# Patient Record
Sex: MALE | Race: WHITE
[De-identification: names, ages, dates, MRNs, and addresses within clinical notes are randomized per-mention and may not be internally consistent; named-entity substitution may affect disease eponyms.]

---

## 2017-04-13 ENCOUNTER — HOSPITAL ENCOUNTER (OUTPATIENT)
Dept: HOSPITAL 79 - KOH-I | Age: 77
End: 2017-04-13
Payer: COMMERCIAL

## 2017-04-13 DIAGNOSIS — J32.8: Primary | ICD-10-CM

## 2021-01-29 ENCOUNTER — IMMUNIZATION (OUTPATIENT)
Dept: VACCINE CLINIC | Facility: HOSPITAL | Age: 81
End: 2021-01-29

## 2021-01-29 ENCOUNTER — APPOINTMENT (OUTPATIENT)
Dept: VACCINE CLINIC | Facility: HOSPITAL | Age: 81
End: 2021-01-29

## 2021-01-29 PROCEDURE — 91300 HC SARSCOV02 VAC 30MCG/0.3ML IM: CPT | Performed by: FAMILY MEDICINE

## 2021-01-29 PROCEDURE — 0001A: CPT | Performed by: FAMILY MEDICINE

## 2021-02-10 ENCOUNTER — HOSPITAL ENCOUNTER (OUTPATIENT)
Dept: HOSPITAL 79 - LAB | Age: 81
End: 2021-02-10
Payer: COMMERCIAL

## 2021-02-10 DIAGNOSIS — N18.4: Primary | ICD-10-CM

## 2021-02-10 LAB
BUN/CREATININE RATIO: 12 (ref 0–10)
HGB BLD-MCNC: 13.4 GM/DL (ref 14–17.5)
RED BLOOD COUNT: 4.78 M/UL (ref 4.2–5.5)
WHITE BLOOD COUNT: 6 K/UL (ref 4.5–11)

## 2021-02-19 ENCOUNTER — IMMUNIZATION (OUTPATIENT)
Dept: VACCINE CLINIC | Facility: HOSPITAL | Age: 81
End: 2021-02-19

## 2021-02-19 PROCEDURE — 91300 HC SARSCOV02 VAC 30MCG/0.3ML IM: CPT | Performed by: INTERNAL MEDICINE

## 2021-02-19 PROCEDURE — 0002A: CPT | Performed by: INTERNAL MEDICINE

## 2021-03-10 ENCOUNTER — HOSPITAL ENCOUNTER (EMERGENCY)
Dept: HOSPITAL 79 - ER1 | Age: 81
Discharge: HOME | End: 2021-03-10
Payer: COMMERCIAL
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
## 2021-03-10 DIAGNOSIS — Z23: ICD-10-CM

## 2021-03-10 DIAGNOSIS — S81.812A: Primary | ICD-10-CM
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
## 2021-09-03 ENCOUNTER — IMMUNIZATION (OUTPATIENT)
Dept: VACCINE CLINIC | Facility: HOSPITAL | Age: 81
End: 2021-09-03

## 2021-09-03 PROCEDURE — 91300 HC SARSCOV02 VAC 30MCG/0.3ML IM: CPT | Performed by: INTERNAL MEDICINE

## 2021-09-03 PROCEDURE — 0003A: CPT | Performed by: INTERNAL MEDICINE

## 2022-01-20 ENCOUNTER — HOSPITAL ENCOUNTER (OUTPATIENT)
Dept: HOSPITAL 79 - LAB | Age: 82
End: 2022-01-20
Payer: COMMERCIAL

## 2022-01-20 DIAGNOSIS — M79.10: Primary | ICD-10-CM

## 2022-01-20 LAB — BUN/CREATININE RATIO: 12 (ref 0–10)

## 2022-03-24 ENCOUNTER — HOSPITAL ENCOUNTER (OUTPATIENT)
Dept: HOSPITAL 79 - LAB | Age: 82
End: 2022-03-24
Payer: COMMERCIAL

## 2022-03-24 DIAGNOSIS — N18.4: Primary | ICD-10-CM

## 2022-03-24 LAB
BUN/CREATININE RATIO: 16 (ref 0–10)
HGB BLD-MCNC: 12.6 GM/DL (ref 14–17.5)
RED BLOOD COUNT: 4.55 M/UL (ref 4.2–5.5)
WHITE BLOOD COUNT: 5.6 K/UL (ref 4.5–11)

## 2022-06-22 ENCOUNTER — HOSPITAL ENCOUNTER (OUTPATIENT)
Dept: HOSPITAL 79 - LAB | Age: 82
End: 2022-06-22
Payer: COMMERCIAL

## 2022-06-22 DIAGNOSIS — D63.1: ICD-10-CM

## 2022-06-22 DIAGNOSIS — E83.52: ICD-10-CM

## 2022-06-22 DIAGNOSIS — N18.4: Primary | ICD-10-CM

## 2022-06-22 LAB
BUN/CREATININE RATIO: 11 (ref 0–10)
HGB BLD-MCNC: 11.8 GM/DL (ref 14–17.5)
RED BLOOD COUNT: 4.15 M/UL (ref 4.2–5.5)
WHITE BLOOD COUNT: 4.7 K/UL (ref 4.5–11)

## 2022-08-17 ENCOUNTER — HOSPITAL ENCOUNTER (OUTPATIENT)
Dept: HOSPITAL 79 - LAB | Age: 82
End: 2022-08-17
Payer: COMMERCIAL

## 2022-08-17 DIAGNOSIS — N18.4: Primary | ICD-10-CM

## 2022-08-17 LAB
BUN/CREATININE RATIO: 11 (ref 0–10)
HGB BLD-MCNC: 12.5 GM/DL (ref 14–17.5)
RED BLOOD COUNT: 4.41 M/UL (ref 4.2–5.5)
WHITE BLOOD COUNT: 4.6 K/UL (ref 4.5–11)

## 2024-05-13 ENCOUNTER — HOSPITAL ENCOUNTER (OUTPATIENT)
Dept: GENERAL RADIOLOGY | Facility: HOSPITAL | Age: 84
Discharge: HOME | End: 2024-05-13
Admitting: FAMILY MEDICINE
Payer: MEDICARE

## 2024-05-13 DIAGNOSIS — R06.02 SHORTNESS OF BREATH: ICD-10-CM

## 2024-05-13 PROCEDURE — 71046 X-RAY EXAM CHEST 2 VIEWS: CPT | Performed by: RADIOLOGY

## 2024-05-13 PROCEDURE — 71046 X-RAY EXAM CHEST 2 VIEWS: CPT

## 2024-10-04 ENCOUNTER — PREP FOR SURGERY (OUTPATIENT)
Dept: OTHER | Facility: HOSPITAL | Age: 84
End: 2024-10-04
Payer: MEDICARE

## 2024-10-04 RX ORDER — ACETAMINOPHEN 325 MG/1
650 TABLET ORAL EVERY 4 HOURS PRN
Status: CANCELLED | OUTPATIENT
Start: 2024-10-04

## 2024-10-04 RX ORDER — TORSEMIDE 20 MG/1
20 TABLET ORAL
Status: CANCELLED | OUTPATIENT
Start: 2024-10-04

## 2024-10-04 RX ORDER — GUAIFENESIN 600 MG/1
600 TABLET, EXTENDED RELEASE ORAL EVERY 12 HOURS SCHEDULED
Status: CANCELLED | OUTPATIENT
Start: 2024-10-04 | End: 2024-10-09

## 2024-10-04 RX ORDER — HYDRALAZINE HYDROCHLORIDE 50 MG/1
100 TABLET, FILM COATED ORAL EVERY 8 HOURS SCHEDULED
Status: CANCELLED | OUTPATIENT
Start: 2024-10-04

## 2024-10-04 RX ORDER — SEVELAMER CARBONATE 800 MG/1
2400 TABLET, FILM COATED ORAL
Status: CANCELLED | OUTPATIENT
Start: 2024-10-04

## 2024-10-04 RX ORDER — AMLODIPINE BESYLATE 10 MG/1
10 TABLET ORAL
Status: CANCELLED | OUTPATIENT
Start: 2024-10-04

## 2024-10-04 RX ORDER — DOCUSATE SODIUM 100 MG/1
100 CAPSULE, LIQUID FILLED ORAL 2 TIMES DAILY
Status: CANCELLED | OUTPATIENT
Start: 2024-10-04

## 2024-10-04 RX ORDER — PANTOPRAZOLE SODIUM 40 MG/1
40 TABLET, DELAYED RELEASE ORAL
Status: CANCELLED | OUTPATIENT
Start: 2024-10-05

## 2024-10-04 RX ORDER — ALBUTEROL SULFATE 90 UG/1
2 INHALANT RESPIRATORY (INHALATION)
Status: CANCELLED | OUTPATIENT
Start: 2024-10-04

## 2024-10-04 RX ORDER — FLUOXETINE 10 MG/1
10 CAPSULE ORAL DAILY
Status: CANCELLED | OUTPATIENT
Start: 2024-10-04

## 2024-10-04 RX ORDER — TAMSULOSIN HYDROCHLORIDE 0.4 MG/1
0.4 CAPSULE ORAL DAILY
Status: CANCELLED | OUTPATIENT
Start: 2024-10-04

## 2024-10-04 RX ORDER — ALPRAZOLAM 0.25 MG
0.25 TABLET ORAL 2 TIMES DAILY PRN
Status: CANCELLED | OUTPATIENT
Start: 2024-10-04 | End: 2024-10-09

## 2024-10-04 RX ORDER — FLUTICASONE PROPIONATE 50 UG/1
2 SPRAY, METERED NASAL DAILY
Status: CANCELLED | OUTPATIENT
Start: 2024-10-04

## 2024-10-04 RX ORDER — CARVEDILOL 6.25 MG/1
12.5 TABLET ORAL 2 TIMES DAILY WITH MEALS
Status: CANCELLED | OUTPATIENT
Start: 2024-10-04

## 2024-10-05 ENCOUNTER — APPOINTMENT (OUTPATIENT)
Dept: GENERAL RADIOLOGY | Facility: HOSPITAL | Age: 84
DRG: 949 | End: 2024-10-05
Payer: MEDICARE

## 2024-10-05 ENCOUNTER — HOSPITAL ENCOUNTER (INPATIENT)
Facility: HOSPITAL | Age: 84
LOS: 3 days | Discharge: HOME HEALTH | DRG: 949 | End: 2024-10-08
Attending: FAMILY MEDICINE | Admitting: FAMILY MEDICINE
Payer: MEDICARE

## 2024-10-05 DIAGNOSIS — S06.5XAA SUBDURAL HEMATOMA: ICD-10-CM

## 2024-10-05 DIAGNOSIS — J96.01 ACUTE RESPIRATORY FAILURE WITH HYPOXIA: Primary | ICD-10-CM

## 2024-10-05 LAB
ALBUMIN SERPL-MCNC: 3.8 G/DL (ref 3.5–5.2)
ALBUMIN/GLOB SERPL: 1.7 G/DL
ALP SERPL-CCNC: 131 U/L (ref 39–117)
ALT SERPL W P-5'-P-CCNC: <5 U/L (ref 1–41)
ANION GAP SERPL CALCULATED.3IONS-SCNC: 13.5 MMOL/L (ref 5–15)
AST SERPL-CCNC: 16 U/L (ref 1–40)
BASOPHILS # BLD AUTO: 0.03 10*3/MM3 (ref 0–0.2)
BASOPHILS NFR BLD AUTO: 0.7 % (ref 0–1.5)
BILIRUB SERPL-MCNC: 0.5 MG/DL (ref 0–1.2)
BUN SERPL-MCNC: 31 MG/DL (ref 8–23)
BUN/CREAT SERPL: 5.7 (ref 7–25)
CALCIUM SPEC-SCNC: 9.5 MG/DL (ref 8.6–10.5)
CHLORIDE SERPL-SCNC: 95 MMOL/L (ref 98–107)
CO2 SERPL-SCNC: 20.5 MMOL/L (ref 22–29)
CREAT SERPL-MCNC: 5.41 MG/DL (ref 0.76–1.27)
DEPRECATED RDW RBC AUTO: 49.5 FL (ref 37–54)
EGFRCR SERPLBLD CKD-EPI 2021: 9.7 ML/MIN/1.73
EOSINOPHIL # BLD AUTO: 0.05 10*3/MM3 (ref 0–0.4)
EOSINOPHIL NFR BLD AUTO: 1.2 % (ref 0.3–6.2)
ERYTHROCYTE [DISTWIDTH] IN BLOOD BY AUTOMATED COUNT: 14.6 % (ref 12.3–15.4)
GLOBULIN UR ELPH-MCNC: 2.3 GM/DL
GLUCOSE SERPL-MCNC: 107 MG/DL (ref 65–99)
HAV IGM SERPL QL IA: NORMAL
HBV CORE IGM SERPL QL IA: NORMAL
HBV SURFACE AG SERPL QL IA: NORMAL
HCT VFR BLD AUTO: 31.3 % (ref 37.5–51)
HCV AB SER QL: NORMAL
HGB BLD-MCNC: 9.6 G/DL (ref 13–17.7)
IMM GRANULOCYTES # BLD AUTO: 0.01 10*3/MM3 (ref 0–0.05)
IMM GRANULOCYTES NFR BLD AUTO: 0.2 % (ref 0–0.5)
LYMPHOCYTES # BLD AUTO: 1.01 10*3/MM3 (ref 0.7–3.1)
LYMPHOCYTES NFR BLD AUTO: 23.3 % (ref 19.6–45.3)
MAGNESIUM SERPL-MCNC: 2.5 MG/DL (ref 1.6–2.4)
MCH RBC QN AUTO: 28.2 PG (ref 26.6–33)
MCHC RBC AUTO-ENTMCNC: 30.7 G/DL (ref 31.5–35.7)
MCV RBC AUTO: 92.1 FL (ref 79–97)
MONOCYTES # BLD AUTO: 0.46 10*3/MM3 (ref 0.1–0.9)
MONOCYTES NFR BLD AUTO: 10.6 % (ref 5–12)
NEUTROPHILS NFR BLD AUTO: 2.78 10*3/MM3 (ref 1.7–7)
NEUTROPHILS NFR BLD AUTO: 64 % (ref 42.7–76)
NRBC BLD AUTO-RTO: 0 /100 WBC (ref 0–0.2)
PLATELET # BLD AUTO: 135 10*3/MM3 (ref 140–450)
PMV BLD AUTO: 10.2 FL (ref 6–12)
POTASSIUM SERPL-SCNC: 4.6 MMOL/L (ref 3.5–5.2)
PROT SERPL-MCNC: 6.1 G/DL (ref 6–8.5)
RBC # BLD AUTO: 3.4 10*6/MM3 (ref 4.14–5.8)
SODIUM SERPL-SCNC: 129 MMOL/L (ref 136–145)
WBC NRBC COR # BLD AUTO: 4.34 10*3/MM3 (ref 3.4–10.8)

## 2024-10-05 PROCEDURE — 83735 ASSAY OF MAGNESIUM: CPT | Performed by: FAMILY MEDICINE

## 2024-10-05 PROCEDURE — 99223 1ST HOSP IP/OBS HIGH 75: CPT | Performed by: FAMILY MEDICINE

## 2024-10-05 PROCEDURE — 80053 COMPREHEN METABOLIC PANEL: CPT | Performed by: FAMILY MEDICINE

## 2024-10-05 PROCEDURE — 85025 COMPLETE CBC W/AUTO DIFF WBC: CPT | Performed by: FAMILY MEDICINE

## 2024-10-05 PROCEDURE — 97530 THERAPEUTIC ACTIVITIES: CPT

## 2024-10-05 PROCEDURE — 94640 AIRWAY INHALATION TREATMENT: CPT

## 2024-10-05 PROCEDURE — 94799 UNLISTED PULMONARY SVC/PX: CPT

## 2024-10-05 PROCEDURE — 92523 SPEECH SOUND LANG COMPREHEN: CPT | Performed by: SPEECH-LANGUAGE PATHOLOGIST

## 2024-10-05 PROCEDURE — 94761 N-INVAS EAR/PLS OXIMETRY MLT: CPT

## 2024-10-05 PROCEDURE — 97535 SELF CARE MNGMENT TRAINING: CPT

## 2024-10-05 PROCEDURE — 97161 PT EVAL LOW COMPLEX 20 MIN: CPT

## 2024-10-05 PROCEDURE — 80074 ACUTE HEPATITIS PANEL: CPT | Performed by: INTERNAL MEDICINE

## 2024-10-05 PROCEDURE — 97116 GAIT TRAINING THERAPY: CPT

## 2024-10-05 PROCEDURE — 94664 DEMO&/EVAL PT USE INHALER: CPT

## 2024-10-05 PROCEDURE — 97167 OT EVAL HIGH COMPLEX 60 MIN: CPT

## 2024-10-05 PROCEDURE — 71045 X-RAY EXAM CHEST 1 VIEW: CPT

## 2024-10-05 RX ORDER — CARVEDILOL 6.25 MG/1
6.25 TABLET ORAL 2 TIMES DAILY WITH MEALS
Status: ON HOLD | COMMUNITY
End: 2024-10-08

## 2024-10-05 RX ORDER — TORSEMIDE 10 MG/1
10 TABLET ORAL 2 TIMES DAILY
Status: ON HOLD | COMMUNITY
End: 2024-10-08

## 2024-10-05 RX ORDER — TORSEMIDE 20 MG/1
20 TABLET ORAL
Status: DISCONTINUED | OUTPATIENT
Start: 2024-10-05 | End: 2024-10-06

## 2024-10-05 RX ORDER — CARVEDILOL 6.25 MG/1
12.5 TABLET ORAL 2 TIMES DAILY WITH MEALS
Status: DISCONTINUED | OUTPATIENT
Start: 2024-10-05 | End: 2024-10-08 | Stop reason: HOSPADM

## 2024-10-05 RX ORDER — AMLODIPINE BESYLATE 10 MG/1
10 TABLET ORAL DAILY
COMMUNITY

## 2024-10-05 RX ORDER — FLUOXETINE 10 MG/1
10 CAPSULE ORAL DAILY
Status: DISCONTINUED | OUTPATIENT
Start: 2024-10-05 | End: 2024-10-08 | Stop reason: HOSPADM

## 2024-10-05 RX ORDER — PANTOPRAZOLE SODIUM 40 MG/1
40 TABLET, DELAYED RELEASE ORAL
Status: DISCONTINUED | OUTPATIENT
Start: 2024-10-05 | End: 2024-10-08 | Stop reason: HOSPADM

## 2024-10-05 RX ORDER — AMLODIPINE BESYLATE 10 MG/1
10 TABLET ORAL
Status: DISCONTINUED | OUTPATIENT
Start: 2024-10-05 | End: 2024-10-08 | Stop reason: HOSPADM

## 2024-10-05 RX ORDER — ALBUTEROL SULFATE 1.25 MG/3ML
1 SOLUTION RESPIRATORY (INHALATION) EVERY 6 HOURS PRN
COMMUNITY

## 2024-10-05 RX ORDER — GABAPENTIN 300 MG/1
300 CAPSULE ORAL 2 TIMES DAILY
Status: ON HOLD | COMMUNITY
End: 2024-10-08

## 2024-10-05 RX ORDER — PANTOPRAZOLE SODIUM 40 MG/1
40 TABLET, DELAYED RELEASE ORAL 2 TIMES DAILY
COMMUNITY

## 2024-10-05 RX ORDER — TAMSULOSIN HYDROCHLORIDE 0.4 MG/1
1 CAPSULE ORAL DAILY
COMMUNITY

## 2024-10-05 RX ORDER — GABAPENTIN 300 MG/1
300 CAPSULE ORAL NIGHTLY
Status: DISCONTINUED | OUTPATIENT
Start: 2024-10-05 | End: 2024-10-06

## 2024-10-05 RX ORDER — SEVELAMER CARBONATE 800 MG/1
2400 TABLET, FILM COATED ORAL
Status: DISCONTINUED | OUTPATIENT
Start: 2024-10-05 | End: 2024-10-08 | Stop reason: HOSPADM

## 2024-10-05 RX ORDER — ACETAMINOPHEN 325 MG/1
650 TABLET ORAL EVERY 4 HOURS PRN
Status: DISCONTINUED | OUTPATIENT
Start: 2024-10-05 | End: 2024-10-08 | Stop reason: HOSPADM

## 2024-10-05 RX ORDER — DOCUSATE SODIUM 100 MG/1
100 CAPSULE, LIQUID FILLED ORAL 2 TIMES DAILY
Status: DISCONTINUED | OUTPATIENT
Start: 2024-10-05 | End: 2024-10-08 | Stop reason: HOSPADM

## 2024-10-05 RX ORDER — ALBUTEROL SULFATE 90 UG/1
2 INHALANT RESPIRATORY (INHALATION)
Status: DISCONTINUED | OUTPATIENT
Start: 2024-10-05 | End: 2024-10-08 | Stop reason: HOSPADM

## 2024-10-05 RX ORDER — FLUTICASONE PROPIONATE 50 UG/1
2 SPRAY, METERED NASAL DAILY
Status: DISCONTINUED | OUTPATIENT
Start: 2024-10-05 | End: 2024-10-08 | Stop reason: HOSPADM

## 2024-10-05 RX ORDER — TAMSULOSIN HYDROCHLORIDE 0.4 MG/1
0.4 CAPSULE ORAL DAILY
Status: DISCONTINUED | OUTPATIENT
Start: 2024-10-05 | End: 2024-10-08 | Stop reason: HOSPADM

## 2024-10-05 RX ORDER — GUAIFENESIN 600 MG/1
600 TABLET, EXTENDED RELEASE ORAL EVERY 12 HOURS SCHEDULED
Status: DISCONTINUED | OUTPATIENT
Start: 2024-10-05 | End: 2024-10-07

## 2024-10-05 RX ORDER — FLUOXETINE 10 MG/1
10 CAPSULE ORAL DAILY
COMMUNITY

## 2024-10-05 RX ORDER — ALPRAZOLAM 0.25 MG
0.25 TABLET ORAL 2 TIMES DAILY PRN
COMMUNITY

## 2024-10-05 RX ORDER — HYDRALAZINE HYDROCHLORIDE 50 MG/1
100 TABLET, FILM COATED ORAL EVERY 8 HOURS SCHEDULED
Status: DISCONTINUED | OUTPATIENT
Start: 2024-10-05 | End: 2024-10-08 | Stop reason: HOSPADM

## 2024-10-05 RX ORDER — FLUTICASONE PROPIONATE 50 UG/1
2 SPRAY, METERED NASAL DAILY
COMMUNITY

## 2024-10-05 RX ORDER — ALPRAZOLAM 0.25 MG
0.25 TABLET ORAL 2 TIMES DAILY PRN
Status: DISCONTINUED | OUTPATIENT
Start: 2024-10-05 | End: 2024-10-07

## 2024-10-05 RX ORDER — HYDROCODONE BITARTRATE AND ACETAMINOPHEN 7.5; 325 MG/1; MG/1
1 TABLET ORAL EVERY 8 HOURS PRN
COMMUNITY

## 2024-10-05 RX ORDER — PANTOPRAZOLE SODIUM 40 MG/1
40 TABLET, DELAYED RELEASE ORAL
Status: DISCONTINUED | OUTPATIENT
Start: 2024-10-05 | End: 2024-10-05

## 2024-10-05 RX ADMIN — GUAIFENESIN 600 MG: 600 TABLET ORAL at 21:22

## 2024-10-05 RX ADMIN — HYDRALAZINE HYDROCHLORIDE 100 MG: 50 TABLET ORAL at 13:35

## 2024-10-05 RX ADMIN — CARVEDILOL 12.5 MG: 6.25 TABLET, FILM COATED ORAL at 17:57

## 2024-10-05 RX ADMIN — DOCUSATE SODIUM 100 MG: 100 CAPSULE, LIQUID FILLED ORAL at 09:37

## 2024-10-05 RX ADMIN — PANTOPRAZOLE SODIUM 40 MG: 40 TABLET, DELAYED RELEASE ORAL at 05:16

## 2024-10-05 RX ADMIN — SEVELAMER CARBONATE 2400 MG: 800 TABLET, FILM COATED ORAL at 17:57

## 2024-10-05 RX ADMIN — ALBUTEROL SULFATE 2 PUFF: 90 AEROSOL, METERED RESPIRATORY (INHALATION) at 19:26

## 2024-10-05 RX ADMIN — TORSEMIDE 20 MG: 20 TABLET ORAL at 09:33

## 2024-10-05 RX ADMIN — SEVELAMER CARBONATE 2400 MG: 800 TABLET, FILM COATED ORAL at 12:12

## 2024-10-05 RX ADMIN — HYDRALAZINE HYDROCHLORIDE 100 MG: 50 TABLET ORAL at 05:16

## 2024-10-05 RX ADMIN — GUAIFENESIN 600 MG: 600 TABLET ORAL at 09:37

## 2024-10-05 RX ADMIN — FLUOXETINE HYDROCHLORIDE 10 MG: 10 CAPSULE ORAL at 09:33

## 2024-10-05 RX ADMIN — TAMSULOSIN HYDROCHLORIDE 0.4 MG: 0.4 CAPSULE ORAL at 09:33

## 2024-10-05 RX ADMIN — TORSEMIDE 20 MG: 20 TABLET ORAL at 17:57

## 2024-10-05 RX ADMIN — GABAPENTIN 300 MG: 300 CAPSULE ORAL at 21:22

## 2024-10-05 RX ADMIN — AMLODIPINE BESYLATE 10 MG: 10 TABLET ORAL at 09:33

## 2024-10-05 RX ADMIN — HYDRALAZINE HYDROCHLORIDE 100 MG: 50 TABLET ORAL at 21:22

## 2024-10-05 RX ADMIN — CARVEDILOL 12.5 MG: 6.25 TABLET, FILM COATED ORAL at 09:33

## 2024-10-05 RX ADMIN — ACETAMINOPHEN 650 MG: 325 TABLET ORAL at 09:33

## 2024-10-05 RX ADMIN — ALBUTEROL SULFATE 2 PUFF: 90 AEROSOL, METERED RESPIRATORY (INHALATION) at 12:30

## 2024-10-05 RX ADMIN — PANTOPRAZOLE SODIUM 40 MG: 40 TABLET, DELAYED RELEASE ORAL at 17:58

## 2024-10-05 RX ADMIN — DOCUSATE SODIUM 100 MG: 100 CAPSULE, LIQUID FILLED ORAL at 21:22

## 2024-10-05 RX ADMIN — SEVELAMER CARBONATE 2400 MG: 800 TABLET, FILM COATED ORAL at 09:33

## 2024-10-05 RX ADMIN — ALBUTEROL SULFATE 2 PUFF: 90 AEROSOL, METERED RESPIRATORY (INHALATION) at 07:22

## 2024-10-05 NOTE — PROGRESS NOTES
Patient Assessment Instrument  Quality Indicators - Admission FY 2023    Section A. Ethnicity/ Race/Language  Ethnicity:  Race:  Preferred Language:    Section A. Transportation      Section B. Hearing and Vision        Section B. Health Literacy        Section C. Cognitive Patterns      Section C. Signs and Symptoms of Delirium (from CAM)      Section D. Mood      Section D. Social Isolation      Section [id number]. Prior Functioning      Section CI5038. Prior Device Use      Section GE0578. Self Care Performance     Eating: [place] provides verbal cues and/or touching/steadying and/or contact  guard assistance as patient completes activity.   Oral Hygiene: [place] provides verbal cues and/or touching/steadying and/or  contact guard assistance as patient completes activity.   Toileting Hygiene: [place] does less than half the effort. [place] lifts, holds  or supports trunk or limbs but provides less than half the effort.   Shower/Bathe Self: [place] does less than half the effort. [place] lifts, holds  or supports trunk or limbs but provides less than half the effort.   Upper Body Dressing: [place] sets up or cleans up; patient completes activity.  [place] assists only prior to or following the activity.   Lower Body Dressing: [place] does more than half the effort. [place] lifts or  holds trunk or limbs and provides more than half the effort.   Putting On/Taking Off Footwear: [place] does more than half the effort. [place]  lifts or holds trunk or limbs and provides more than half the effort.    Section [id number]. Self Care Discharge Goals     Eating: [place] sets up or cleans up; patient completes activity. [place] assists  only prior to or following the activity.   Oral Hygiene: [place] sets up or cleans up; patient completes activity. [place]  assists only prior to or following the activity.   Toileting Hygiene: [place] provides verbal cues or touching/steadying assistance  as patient completes activity.   Shower/Bathe Self: [place]  provides verbal cues or touching/steadying assistance  as patient completes activity.   Upper Body Dressing: [place] sets up or cleans up; patient completes activity.  [place] assists only prior to or following the activity.   Lower Body Dressing: [place] does less than half the effort. [place] lifts, holds  or supports trunk or limbs but provides less than half the effort.   Putting On/Taking Off Footwear: [place] does less than half the effort. [place]  lifts, holds or supports trunk or limbs but provides less than half the effort.    Section [id number]. Mobility Performance      Section [id number]. Mobility Discharge Goals      Section H. Bladder and Bowel      Section I. Active Diagnosis      Section J. Health Conditions      Section J. Health Conditions (Pain)      Section K. Swallowing/Nutritional Status    Nutritional Approaches on Admission:    Section M. Skin Conditions      Section N. Medication        Section O. Special Treatments, Procedures, and Programs    Signed by: Nolvia Nguyen OT

## 2024-10-05 NOTE — PROGRESS NOTES
Occupational Therapy:    Physical Therapy:    Speech Language Pathology: Individual: 45 minutes.    Signed by: RAINER López

## 2024-10-05 NOTE — PROGRESS NOTES
Occupational Therapy: Individual: 70 minutes.    Physical Therapy:    Speech Language Pathology:    Signed by: Nolvia Nguyen OT

## 2024-10-05 NOTE — PROGRESS NOTES
Inpatient Rehabilitation Functional Measures Assessment and Plan of Care    Plan of Care      Functional Measures  LESVIA Eating:  LESVIA Grooming:  LESVIA Bathing:  LESVIA Upper Body Dressing:  LESVIA Lower Body Dressing:  LESVIA Toileting:    LESVIA Bladder Management  Level of Assistance:  Frequency/Number of Accidents this Shift:    LESVIA Bowel Management  Level of Assistance:  Frequency/Number of Accidents this Shift:    LESVIA Bed/Chair/Wheelchair Transfer:  Bed/chair/wheelchair Transfer Score = 4.  Patient performs 75% or more of effort and minimal assistance (little/incidental  help/lifting of one limb/steadying) for transferring to and from the  bed/chair/wheelchair, requiring: Patient requires the following assistive  device(s): Walker. Seating system of wheelchair.  LESVIA Toilet Transfer:  LESVIA Tub/Shower Transfer:    Previously Documented Mode of Locomotion at Discharge:  LESVIA Expected Mode of Locomotion at Discharge: The expected mode of most  frequently used locomotion, at discharge, is expected to be walking.  LESVIA Walk/Wheelchair:  WHEELCHAIR OBSERVATION   Wheelchair did not occur.    WALK OBSERVATION   Walk Distance Scale = 2.  Distance walked is 50 -149 feet. Walk Score = 2.  Patient performs 75% or more of effort and requires minimal assistance.  Incidental assistance, contact guard or steadying was provided. Patient walked a  distance of 110 feet. Patient requires the following assistive device(s):  Rolling walker.  LESVIA Stairs:  Stairs did not occur.    LESVIA Comprehension:  LESVIA Expression:  LESVIA Social Interaction:  LESVIA Problem Solving:  LESVIA Memory:    Therapy Mode Minutes  Occupational Therapy:  Physical Therapy: Individual: 75 minutes.  Speech Language Pathology:    Discharge Functional Goals:  Bed, Chair, Wheelchair Transfers: 6  Walk: 5    Signed by: Deepika Puri, Physical Therapist

## 2024-10-05 NOTE — PROGRESS NOTES
"Patient Assessment Instrument  Quality Indicators - Admission FY 2023    Section A. Ethnicity/ Race/Language  Ethnicity:  Not of , /a, French Origin  Race:  White  Preferred Language:  english  Requests  to Communicate:   No    Section A. Transportation      Section B. Hearing and Vision  Expression of Ideas and Wants: Exhibits some difficulty with expressing needs  and ideas (e.g., some words or finishing thoughts) or speech is not clear.  Understanding Verbal and Non-Verbal Content: Usually Understands: Understands  most conversations, but misses some part/intent of message. Requires cues at  times to understand.      Section B. Health Literacy  Frequency of Needing Assistance Reading:  Always      Section C. Cognitive Patterns  Brief Interview for Mental Status (BIMS) was conducted.  Repetition of Three Words: Three words  Able to report correct year: Correct  Able to report correct month: Accurate within 5 days  Able to report correct day of the week: Correct  Able to recall \"sock\": Yes, no cue required  Able to recall \"blue\": Yes, no cue required  Able to recall \"bed\": No, could not recall    BIMS SUMMARY SCORE: 13 Cognitively intact Patient was able to complete the Brief  Interview for Mental Status    Section C. Signs and Symptoms of Delirium (from CAM)      Section D. Mood  Presence of little interest or pleasure in doing things:   No  Frequency of having little interest or pleasure in doing things:   Never or 1  day  Presence of feeling down, depressed, or hopeless:   No  Frequency of feeling down, depressed, or hopeless:   Never or 1 day   Interview Ended. Above responses do not meet criteria to continue.  Total Severity Score:   00    Section D. Social Isolation  Frequ[place] of Feeling Lonely or Isolated:  Rarely    Section [id number]. Prior Functioning      Section SC7718. Prior Device Use      Section NA2155. Self Care Performance      Section [id number]. Self Care Discharge " Goals      Section CK3919. Mobility Performance      Section FD0636. Mobility Discharge Goals      Section H. Bladder and Bowel      Section I. Active Diagnosis      Section J. Health Conditions      Section J. Health Conditions (Pain)  Pain Effect on Sleep:   Occasionally  Pain Interference with Therapy Activities:   Rarely or not at all  Pain Interference with Day-to-Day Activities:   Occasionally    Section K. Swallowing/Nutritional Status    Nutritional Approaches on Admission:    Section M. Skin Conditions  Unhealed Pressure Ulcer/Injuries at Stage 1 or Higher on Admission:  No.    Section N. Medication        Section O. Special Treatments, Procedures, and Programs    Signed by: Sissy Jovel Nurse

## 2024-10-05 NOTE — PLAN OF CARE
Problem: Rehabilitation (IRF) Plan of Care  Goal: Absence of New-Onset Illness or Injury  Intervention: Prevent Fall and Fall Injury  Recent Flowsheet Documentation  Taken 10/5/2024 1000 by Patricia Nice RN  Safety Promotion/Fall Prevention:   fall prevention program maintained   nonskid shoes/slippers when out of bed   safety round/check completed  Taken 10/5/2024 0800 by Patricia Nice, RN  Safety Promotion/Fall Prevention:   fall prevention program maintained   nonskid shoes/slippers when out of bed   safety round/check completed  Taken 10/5/2024 0756 by Patricia Nice, RN  Safety Promotion/Fall Prevention:   fall prevention program maintained   nonskid shoes/slippers when out of bed   safety round/check completed   Goal Outcome Evaluation:

## 2024-10-05 NOTE — THERAPY EVALUATION
Inpatient Rehabilitation - Occupational Therapy Initial Evaluation and Treatment Note    NEAL Jimmy     Patient Name: Simon Benitez  : 1940  MRN: [id number]    Today's Date: 10/5/2024                 Admit Date: 10/5/2024       No diagnosis found.    Patient Active Problem List   Diagnosis    Subdural hematoma       Past Medical History:   Diagnosis Date    ESRD (end stage renal disease) on dialysis     Hypertension        History reviewed. No pertinent surgical history.          IRF OT ASSESSMENT FLOWSHEET (Last 12 Hours)       IRF OT Evaluation and Treatment       Row Name 10/05/24 1154          OT Time and Intention    Document Type initial evaluation;daily treatment  -HB     Mode of Treatment individual therapy;occupational therapy  -HB     Patient Effort good  -HB     Symptoms Noted During/After Treatment fatigue  -HB       Row Name 10/05/24 1154          General Information    Patient Profile Reviewed yes  -HB     Patient/Family/Caregiver Comments/Observations Patient and RN okd OT this date.  -HB     General Observations of Patient Patient tolerated OT well with no adverse reactions. Patient presents with B SDH s/p fall. Pt had ruben hold procedure at outside hospital.  -HB     Existing Precautions/Restrictions fall;oxygen therapy device and L/min  -HB       Row Name 10/05/24 1154          Previous Level of Function/Home Environm    Bathing/Grooming, Premorbid Functional Level independent  -HB     Dressing, Premorbid Functional Level independent  -HB     Eating/Feeding, Premorbid Functional Level independent  -HB     Toileting, Premorbid Functional Level independent  -HB     BADLs, Premorbid Functional Level independent  -HB       Row Name 10/05/24 1154          Living Environment    Current Living Arrangements home  -HB     People in Home spouse  -HB       Row Name 10/05/24 1154          Pain Assessment    Pretreatment Pain Rating 3/10  -HB     Posttreatment Pain Rating 3/10  -HB     Pain Location -  Side/Orientation --  neck  -HB       Row Name 10/05/24 1154          Pain Scale: FACES Pre/Post-Treatment    Pre/Posttreatment Pain Comment --  massage  -HB       Row Name 10/05/24 1154          Cognition/Psychosocial    Affect/Mental Status (Cognition) --  some intermitting confusion  -     Orientation Status (Cognition) disoriented to;time  -     Follows Commands (Cognition) follows one-step commands;increased processing time needed;physical/tactile prompts required;verbal cues/prompting required;repetition of directions required  -HB       Row Name 10/05/24 1154          Range of Motion Comprehensive    General Range of Motion bilateral upper extremity ROM WFL  -HB       Row Name 10/05/24 1154          Strength (Manual Muscle Testing)    Strength (Manual Muscle Testing) bilateral upper extremities  3+/5 grossly  -HB       Row Name 10/05/24 1154          Basic Activities of Daily Living (BADLs)    Basic Activities of Daily Living bathing;upper body dressing;lower body dressing;grooming;toileting;self-feeding  -HB       Row Name 10/05/24 1154          Bathing    [place] Level (Bathing) bathing skills;maximum assist (25% patient effort)  -HB       Row Name 10/05/24 1154          Upper Body Dressing    [place] Level (Upper Body Dressing) upper body dressing skills;minimum assist (75% or more patient effort)  -HB       Row Name 10/05/24 1154          Lower Body Dressing    [place] Level (Lower Body Dressing) maximum assist (25% patient effort)  -HB       Row Name 10/05/24 1154          Grooming    [place] Level (Grooming) grooming skills;minimum assist (75% patient effort)  -HB       Row Name 10/05/24 1154          Toileting    [place] Level (Toileting) toileting skills;moderate assist (50% patient effort)  -HB       Row Name 10/05/24 1154          Self-Feeding    [place] Level (Self-Feeding) feeding skills;minimum assist (75% patient effort)  -HB       Row Name 10/05/24 1154           Bed Mobility    Bed Mobility sit-supine  -     Supine-Sit [place] (Bed Mobility) contact guard;verbal cues;nonverbal cues (demo/gesture)  -       Row Name 10/05/24 1154          Transfer Assessment/Treatment    Transfers toilet transfer;chair-bed transfer  -       Row Name 10/05/24 1154          Chair-Bed Transfer    Chair-Bed [place] (Transfers) minimum assist (75% patient effort);nonverbal cues (demo/gesture);verbal cues  -     Assistive Device (Chair-Bed Transfers) wheelchair  -       Row Name 10/05/24 1154          Sit-Stand Transfer    Sit-Stand [place] (Transfers) minimum assist (75% patient effort);contact guard;nonverbal cues (demo/gesture);verbal cues  -       Row Name 10/05/24 1154          Stand-Sit Transfer    Stand-Sit [place] (Transfers) minimum assist (75% patient effort);contact guard;nonverbal cues (demo/gesture);verbal cues  -       Row Name 10/05/24 1154          Toilet Transfer    Type (Toilet Transfer) stand pivot/stand step  -     [place] Level (Toilet Transfer) minimum assist (75% patient effort);contact guard;nonverbal cues (demo/gesture);verbal cues  -     Assistive Device (Toilet Transfer) wheelchair;grab bars/safety frame;raised toilet seat  -       Row Name 10/05/24 1154          Motor Skills    Motor Skills coordination;functional endurance;motor control/coordination interventions  -     Motor Control/Coordination Interventions therapeutic exercise/ROM;gross motor coordination activities  -     Therapeutic Exercise shoulder;elbow/forearm;wrist;hand  -     Additional Documentation --  BUE TA To increase ADL status/endurnace; rest between tasks  -       Row Name 10/05/24 1154          Positioning and Restraints    Pre-Treatment Position sitting in chair/recliner  -     Post Treatment Position bed  -     In Bed encouraged to call for assist;call light within reach;exit alarm on  -       Row Name 10/05/24 1154          Therapy  Assessment/Plan (OT)    Patient's Goals For Discharge return home;take care of myself at home  -       Row Name 10/05/24 7630          Therapy Assessment/Plan (OT)    OT Diagnosis Decreased I/ADL status; functional mobility; BUE m/s; endurance  -     Rehab Potential/Prognosis (OT) adequate, monitor progress closely  -     Frequency of Treatment (OT) 5 times per week;90 minutes per session  -     Estimated Duration of Therapy (OT) 2 weeks  -     Activity Limitations Related to Problem List (OT) unable to ambulate safely;BADLs not performed adequately or safely;IADLs not performed adequately or safely;unable to transfer safely  -     Planned Therapy Interventions (OT) activity tolerance training;adaptive equipment training;BADL retraining;IADL retraining;passive ROM/stretching;ROM/therapeutic exercise;strengthening exercise;transfer/mobility retraining  -       Row Name 10/05/24 5742          Therapy Plan Review/Discharge Plan (OT)    Therapy Plan Review (OT) evaluation/treatment results reviewed;care plan/treatment goals reviewed;patient  -HB     Anticipated Equipment Needs At Discharge (OT) --  TBD  -HB     Anticipated Discharge Disposition (OT) home with home health;home with assist  -       Row Name 10/05/24 5305          IRF OT Goals    Transfer Goal Selection (OT-IRF) transfers, OT goal 1;transfers, OT goal 2  -HB     LB Dressing Goal Selection (OT-IRF) LB dressing, OT goal 1;LB dressing, OT goal 2  -HB     Toileting Goal Selection (OT-IRF) toileting, OT goal 1;toileting, OT goal 2  -       Row Name 10/05/24 6860          Transfer Goal 1 (OT-IRF)    Activity/Assistive Device (Transfer Goal 1, OT-IRF) toilet  -     [place] Level (Transfer Goal 1, OT-IRF) contact guard required  -     Time Frame (Transfer Goal 1, OT-IRF) short-term goal (STG)  -HB     Progress/Outcomes (Transfer Goal 1, OT-IRF) new goal  -       Row Name 10/05/24 0126          Transfer Goal 2 (OT-IRF)     Activity/Assistive Device (Transfer Goal 2, OT-IRF) toilet  -HB     [place] Level (Transfer Goal 2, OT-IRF) supervision required  -HB     Time Frame (Transfer Goal 2, OT-IRF) by discharge  -HB     Progress/Outcomes (Transfer Goal 2, OT-IRF) new goal  -HB       Row Name 10/05/24 1154          LB Dressing Goal 1 (OT-IRF)    Activity/Device (LB Dressing Goal 1, OT-IRF) lower body dressing  -HB     [place] (LB Dressing Goal 1, OT-IRF) moderate assist (50-74% patient effort)  -HB     Time Frame (LB Dressing Goal 1, OT-IRF) short-term goal (STG)  -HB     Progress/Outcomes (LB Dressing Goal 1, OT-IRF) new goal  -HB       Row Name 10/05/24 1154          LB Dressing Goal 2 (OT-IRF)    Activity/Device (LB Dressing Goal 2, OT-IRF) lower body dressing  -HB     [place] (LB Dressing Goal 2, OT-IRF) minimum assist (75% or more patient effort)  -HB     Time Frame (LB Dressing Goal 2, OT-IRF) by discharge  -HB     Progress/Outcomes (LB Dressing Goal 2, OT-IRF) new goal  -HB       Row Name 10/05/24 1154          Toileting Goal 1 (OT-IRF)    Activity/Device (Toileting Goal 1, OT-IRF) toileting skills, all  -HB     [place] Level (Toileting Goal 1, OT-IRF) minimum assist (75% or more patient effort)  -HB     Progress/Outcomes (Toileting Goal 1, OT-IRF) new goal  -HB     Time Frame (Toileting Goal 1, OT-IRF) short-term goal (STG)  -HB       Row Name 10/05/24 1154          Toileting Goal 2 (OT-IRF)    Activity/Device (Toileting Goal 2, OT-IRF) toileting skills, all  -HB     [place] Level (Toileting Goal 2, OT-IRF) set-up required  -HB     Progress/Outcomes (Toileting Goal 2, OT-IRF) new goal  -HB     Time Frame (Toileting Goal 2, OT-IRF) by discharge  -HB               User Key  (r) = Recorded By, (t) = Taken By, (c) = Cosigned By      Initials Name Effective Dates    HB Nolvia Nguyen, OT 05/25/21 -                      Occupational Therapy Education       Title: PT OT SLP Therapies (Done)       Topic:  Occupational Therapy (Done)       Point: ADL training (Done)       Description:   Instruct learner(s) on proper safety adaptation and remediation techniques during self care or transfers.   Instruct in proper use of assistive devices.                  Learning Progress Summary             Patient Acceptance, E, VU,NR by  at 10/5/2024 1216                         Point: Home exercise program (Done)       Description:   Instruct learner(s) on appropriate technique for monitoring, assisting and/or progressing therapeutic exercises/activities.                  Learning Progress Summary             Patient Acceptance, E, VU,NR by  at 10/5/2024 1216                         Point: Precautions (Done)       Description:   Instruct learner(s) on prescribed precautions during self-care and functional transfers.                  Learning Progress Summary             Patient Acceptance, E, VU,NR by  at 10/5/2024 1216                         Point: Body mechanics (Done)       Description:   Instruct learner(s) on proper positioning and spine alignment during self-care, functional mobility activities and/or exercises.                  Learning Progress Summary             Patient Acceptance, E, VU,NR by  at 10/5/2024 1216                                         User Key       Initials Effective Dates Name Provider Type Discipline     05/25/21 -  Nolvia Nguyen OT Occupational Therapist OT                        OT Recommendation and Plan    Planned Therapy Interventions (OT): activity tolerance training, adaptive equipment training, BADL retraining, IADL retraining, passive ROM/stretching, ROM/therapeutic exercise, strengthening exercise, transfer/mobility retraining                    Time Calculation:      Time Calculation- OT       Row Name 10/05/24 1154             Time Calculation- OT    OT Start Time 0945  -      OT Stop Time 1055  -      OT Time Calculation (min) 70 min  -      Total Timed Code Minutes- OT  70 minute(s)  -                User Key  (r) = Recorded By, (t) = Taken By, (c) = Cosigned By      Initials Name Provider Type     Nolvia Nguyen OT Occupational Therapist                  Therapy Charges for Today       Code Description Service Date Service Provider Modifiers Qty    [id number]  OT EVAL HIGH COMPLEXITY 3 10/5/2024 Nolvia Nguyen OT GO 1    [id number]  OT SELF CARE/MGMT/TRAIN EA 15 MIN 10/5/2024 Nolvia Nguyen OT GO 1    [id number]  OT THERAPEUTIC ACT EA 15 MIN 10/5/2024 Nolvia Nguyen OT GO 1                     Nolvia Ngyuen OT  10/5/2024

## 2024-10-05 NOTE — PLAN OF CARE
Goal Outcome Evaluation:      SPEECH THERAPY PLAN OF CARE:    Simon Benitez will benefit from formal speech therapy  5 days per week at 15-60 minute sessions, as functionally tolerated, for 3-21 days, to address:    Long Term Goal:  Patient will demonstrate wfl speech at premorbid baseline level to maximally participate in adls.   Patient will demonstrate wfl cognitive skills at premorbid baseline levels to maximally participate in adls.     Short Term Goals:  Patient will maintain topic of conversation appropriately over 5 minute conversational exchanges with 90% acc, min cues.   Patient will answer OE questions with 90% acc and min cues for thought completion.   Patient will participate in conversational exchanges over 5 minutes with 90 acc, min cues for thought completion.   4. Patient will perform divergent naming tasks w/ 10 items named in a given broad category in <1 min over three consecutive sessions.   5. Patient will perform divergent naming tasks w/ 5 items named in a given narrow category in <1 min over three consecutive session.  6. Patient will improve processing skills to perform multi-step tasks w/ 90% accuracy w/ min cues over three consecutive skills.     *goals to be added/changed as functionally appropriate.    Thank you for allowing me to participate in the care of your patient-  Mary Draper M.A., CCC-SLP

## 2024-10-05 NOTE — H&P
[facility]    Patient Identification:  Name:  Simon Benitez  Age:  83 y.o.  Sex:  male  :  1940  MRN:  [id number]   Visit Number:  [id number]  Room number:  105/2S  Primary Care Physician:  Provider, No Known     Subjective     10/5/2024   Chief complaint: Bilateral subdural hematoma    History of presenting illness:  83 y.o. male  This pt is seen today for post admission physician evaluation to the inpatient rehabilitation facility.  Patient is an 83-year-old gentleman with history of end-stage renal disease requiring hemodialysis, hypertension, BPH, GERD, depression and intermittent anxiety.  Patient apparently had had concussion in the past and did take tramadol and Norco for chronic back pain in the past.  Patient apparently had had a fall in August and had initial CT examination at [facility] in [place] and there was no evidence of bleeding at that time.  Patient recently was getting dialysis and had mental status changes was difficult to arouse and was sent to the emergency department was found to have bilateral subdural hematomas.  Patient was transferred to [facility] in [facility].  When he arrived patient did have markedly elevated blood pressure and did require IV and Cardene drip.  Patient did receive 2 bur holes for evacuation of hemorrhage.  During his time in the hospital patient has had waxing and waning alertness and at times confusion.  Talking with daughter this morning he has some days where he is just more tired and somnolent and then has intermittent good days.  During his time at  patient did require oxygen which is new for him.  It looks that patient was on 5 to 6 L but this has been weaned down to 2 L; patient apparently had pleural effusions/pulmonary edema causing the respiratory failure..  During his stay he was diagnosed with a right internal jugular thrombus.    Obviously cannot place on anticoagulation at this  time secondary to recent subdural hematomas.  Patient does have chronic normocytic anemia.  Patient has been able to work with physical therapy while at [facility] and has been ambulating in the halls according to daughter.  Consequently patient thought to be a good candidate for inpatient physical rehab      Patient continued to progress medically.  Physical therapy and Occupational therapy evaluations were completed with recommended acute inpatient rehabilitation referral for continued functional mobility intervention and reeducation.  Acute rehab referral ordered for continued medical monitoring and management post prolonged hospitalization, continued respiratory status monitoring, lab monitoring, pain mgt needs, bowel/bladder care with new medication education, skin monitoring and breakdown prevention along with ongoing medical comorbidities that require ongoing care.    The preadmission mini-FIM score as assessed by the referring facility as follows: Eating 5; grooming 4; bathing 1; dressing upper body 2; dressing lower body 4; toileting 1; transfers to bed chair and wheelchair 2; locomotion 3; bladder management 1; bowel management 1; comprehension 3; expression 3; substractions 4; problem solving 3; memory 2.    ---------------------------------------------------------------------------------------------------------------------   Review of Systems:  General: No fever no chills  HEENT: Has had postconcussive headaches from August.  Recent bilateral subdural hematomas with bur holes bilaterally.  Heart: No chest pain, no history of cardiac issues  Lungs: Has required O2 since hospitalization at [facility].  Patient on 2 L at this time  Abdomen: Occasional constipation and GERD symptoms  : History of BPH  Musculoskeletal: Some chronic back pain  Neuro: Intermittent confusion.  Intermittent fatigue.  Has been more somnolent since subdural hematomas  Skin: Negative    Surgical history  Cataract  removal  Bilateral bur holes for subdural hematoma  History of nephrectomy    Social history no smoking no ethanol use    Family history daughter states that his family lived up into their 90s and were very healthy.  ---------------------------------------------------------------------------------------------------------------------   Past Medical History:   Diagnosis Date    ESRD (end stage renal disease) on dialysis     Hypertension      History reviewed. No pertinent surgical history.  History reviewed. No pertinent family history.  Social History     Socioeconomic History    Marital status:      ---------------------------------------------------------------------------------------------------------------------   Allergies:  Patient has no known allergies.  ---------------------------------------------------------------------------------------------------------------------   Medications below are reported home medications pulling from within the system; at this time, these medications have not been reconciled unless otherwise specified and are in the verification process for further verifcation as current home medications.    Prior to Admission Medications       Prescriptions Last Dose Informant Patient Reported? Taking?    albuterol (ACCUNEB) 1.25 MG/3ML nebulizer solution Unknown Self, Other Yes No    Take 3 mL by nebulization Every 6 (Six) Hours As Needed for Shortness of Air.    ALPRAZolam (XANAX) 0.25 MG tablet Unknown Self, Other Yes No    Take 1 tablet by mouth 2 (Two) Times a Day As Needed for Anxiety.    amLODIPine (NORVASC) 10 MG tablet Unknown Self, Other Yes No    Take 1 tablet by mouth Daily.    carvedilol (COREG) 6.25 MG tablet Unknown Self, Other Yes No    Take 1 tablet by mouth 2 (Two) Times a Day With Meals.    FLUoxetine (PROzac) 10 MG capsule Unknown Self, Other Yes No    Take 1 capsule by mouth Daily.    fluticasone (FLONASE) 50 MCG/ACT nasal spray Unknown Self, Other Yes No     Administer 2 sprays into the nostril(s) as directed by provider Daily.    gabapentin (NEURONTIN) 300 MG capsule Unknown Self, Other Yes No    Take 1 capsule by mouth 2 (Two) Times a Day.    HYDROcodone-acetaminophen (NORCO) 7.5-325 MG per tablet Unknown Self, Other Yes No    Take 1 tablet by mouth Every 8 (Eight) Hours As Needed for Moderate Pain.    pantoprazole (PROTONIX) 40 MG EC tablet Unknown Self, Other Yes No    Take 1 tablet by mouth 2 (Two) Times a Day.    tamsulosin (FLOMAX) 0.4 MG capsule 24 hr capsule Unknown Self, Other Yes No    Take 1 capsule by mouth Daily.    torsemide (DEMADEX) 10 MG tablet Unknown Self, Other Yes No    Take 1 tablet by mouth 2 (Two) Times a Day.          Objective     Vital Signs:  Temp:  [97.8 °F (36.6 °C)] 97.8 °F (36.6 °C)  Heart Rate:  [57-62] 62  Resp:  [18-20] 18  BP: (132-153)/(52-67) 132/52    Mean Arterial Pressure (Non-Invasive) for the past 24 hrs (Last 3 readings):   Noninvasive MAP (mmHg)   10/05/24 0512 92     SpO2:  [90 %-94 %] 94 %  on  Flow (L/min):  [3-4.5] 3;   Device (Oxygen Therapy): nasal cannula  Body mass index is 26.14 kg/m².    Wt Readings from Last 3 Encounters:   10/05/24 85 kg (187 lb 6.4 oz)      ---------------------------------------------------------------------------------------------------------------------     Physical exam:  Constitutional: Elderly gentleman in no acute distress.  Patient slightly confused this morning  HEENT: Normocephalic; status post bilateral bur holes  Neck: Supple  Cardiovascular: Regular rate and rhythm without murmurs rubs gallops  Pulmonary/Chest: Clear but decreased breath sounds in the bases  Abdominal: Positive bowel sounds soft.   Musculoskeletal: No arthropathy  Neurological: Patient confused to situation this morning.  No obvious focal deficits moving all extremities.  Skin: No rash  Peripheral vascular: No  "edema  :  ---------------------------------------------------------------------------------------------------------------------    No orders to display           Last echocardiogram:    --------------------------------------------------------------------------------------------------------------------  Labs:  Results from last 7 days   Lab Units 10/05/24  0113   WBC 10*3/mm3 4.34   HEMOGLOBIN g/dL 9.6*   HEMATOCRIT % 31.3*   MCV fL 92.1   MCHC g/dL 30.7*   PLATELETS 10*3/mm3 135*         Results from last 7 days   Lab Units 10/05/24  0113   SODIUM mmol/L 129*   POTASSIUM mmol/L 4.6   MAGNESIUM mg/dL 2.5*   CHLORIDE mmol/L 95*   CO2 mmol/L 20.5*   BUN mg/dL 31*   CREATININE mg/dL 5.41*   CALCIUM mg/dL 9.5   GLUCOSE mg/dL 107*   ALBUMIN g/dL 3.8   BILIRUBIN mg/dL 0.5   ALK PHOS U/L 131*   AST (SGOT) U/L 16   ALT (SGPT) U/L <5   Estimated Creatinine Clearance: 12 mL/min (A) (by C-G formula based on SCr of 5.41 mg/dL (H)).  No results found for: \"AMMONIA\"          No results found for: \"HGBA1C\", \"POCGLU\"  No results found for: \"TSH\", \"FREET4\"  No results found for: \"PREGTESTUR\", \"PREGSERUM\", \"HCG\", \"HCGQUANT\"  Pain Management Panel           No data to display              Brief Urine Lab Results       None          No results found for: \"BLOODCX\"  No results found for: \"URINECX\"  No results found for: \"WOUNDCX\"  No results found for: \"STOOLCX\"    Last Urine Toxicity           No data to display                I have personally looked at the labs and they are summarized above.  ----------------------------------------------------------------------------------------------------------------------  Detailed radiology reports for the last 24 hours:    Imaging Results (Last 24 Hours)       ** No results found for the last 24 hours. **          Final impressions for the last 30 days of radiology reports:    XR chest AP portable    Result Date: 10/1/2024  Edema and effusions. Images reviewed, interpreted, and dictated by " Corbin Hamm. Transcribed by Mary Melton PA-C.    CT Head Without Contrast    Result Date: 9/30/2024  Grossly stable bilateral cerebral convexity extra-axial heterogeneously hyperdense fluid collections, likely evolving bilateral subdural hemorrhages. Continued follow-up is recommended. CRITICAL RESULT: No. COMMUNICATION: Per this written report. Images personally reviewed, interpreted, and dictated by MARLIN Hyatt.    XR chest AP portable    Result Date: 9/28/2024  There has been no significant interval change  .  Continued follow-up recommended . Images reviewed, interpreted, and dictated by Dr. JOSÉ ANTONIO Guidry. Transcribed by Mary Melton PA-C.    XR chest AP portable    Result Date: 9/27/2024  Moderate to large bilateral pleural effusions with pulmonary edema and probable bibasilar atelectasis. Images reviewed, interpreted, and dictated by Dr. Corbin Hamm. Transcribed by Malinda Lyle PA-C.    XR chest AP portable    Result Date: 9/26/2024  No significant interval change. Continued follow-up is recommended. Images reviewed, interpreted, and dictated by Dr. Edmundo Sanchez. Transcribed by Malinda Lyle PA-C.    XR chest AP portable    Result Date: 9/25/2024  Slight interval improvement in pulmonary edema persistent bilateral pleural effusions. Images reviewed, interpreted, and dictated by Dr. Corbin Hamm. Transcribed by Manuel Hernandez PA-C    XR chest AP portable    Result Date: 9/24/2024  Interval worsening as above. Continued follow up recommended. Images reviewed, interpreted, and dictated by Dr. JOSÉ ANTONIO Guidry. Transcribed by Malinda Lyle PA-C.    CT Head Without Contrast    Result Date: 9/23/2024  Interval removal of surgical drains with interval frontal and parietal ruben hole surgery. Persistent, bilateral subdural fluid collections improved on the right and stable on the left. Images reviewed, interpreted, and dictated by Dr. Corbin Hamm. Transcribed by Adeel Nelson PA-C.    XR chest AP  portable    Result Date: 9/23/2024  Improved vascular congestion and edema. Stable small effusions and atelectasis.    Images reviewed, interpreted, and dictated by Dr. Corbin Hamm. Transcribed by Adeel Nelson PA-C.    XR chest AP portable    Result Date: 9/22/2024  No significant change. Images reviewed, interpreted, and dictated by Dr. Nam Murillo. Transcribed by Manuel Hernandez PA-C    XR chest AP portable    Result Date: 9/20/2024  Worsening bilateral mixed interstitial and alveolar opacities and bilateral pleural effusions. Images reviewed, interpreted, and dictated by Dr. Lori Montoya. Transcribed by Moni Jefferson PA-C.    CT Head Without Contrast    Result Date: 9/20/2024  1. Exam limited by motion. 2. Grossly stable bilateral mixed density subdural hemorrhages with stable right to left midline shift. Images reviewed, interpreted, and dictated by Lori Montoya MD    XR chest AP portable    Result Date: 9/19/2024  Increasing right pleural effusion. Vascular engorgement. Images reviewed, interpreted, and dictated by Dr. Nam Murillo. Transcribed by Adeel Nelson PA-C.    CT Head Without Contrast    Result Date: 9/19/2024  1. Interval placement of subdural drains in bilateral subdural hematomas. 2. Interval increase in density of the bilateral subdural hemorrhages, likely related to an acute component. Overall, the size of the hematomas has decreased slightly compared to prior exam. Images reviewed, interpreted, and dictated by Lori Montoya MD    CT brain with IV contrast    Result Date: 9/17/2024  Bilateral subdural hematomas causing diffuse sulcal effacement and relatively symmetric mass effect. Communication: Findings personally discussed by telephone with Dr. Pennington. 9/17/2024 1925 Images personally reviewed, interpreted and dictated by PRACHI Levi M.D.    XR chest AP portable    Result Date: 9/17/2024  Lung findings are most compatible with cardiogenic pulmonary edema/CHF. Images personally  reviewed, interpreted and dictated by PRACHI Levi M.D.    US DOPPLER VENOUS ARM RIGHT    Result Date: 9/16/2024  The ultrasound findings of the right internal jugular vein correlate with the CT findings, showing thrombus with occlusion of the right internal jugular vein within the lower neck region. Right upper extremity fistula is patent. No right upper extremity venous thrombus identified. Images reviewed, interpreted, and dictated by Jackie Beard MD   I have personally looked at the radiology images and read the final radiology report.    Assessment & Plan    Status post bilateral subdural hematomas with bur holes and evacuation of hematoma--patient has had some intermittent confusion and increased somnolence.  Will arrange for patient to have speech therapy evaluation for help with cognition.  Patient appears to be swallowing reasonably well.  Patient will require physical therapy 90 minutes/day 5 to 6 days/week prep therapeutic exercise, range of motion, endurance, gait training, safety, stair navigation and strengthening.  Require occupational therapy 1 to 2 hours/day 5 to 6 days/week with dressing, ADLs, feeding, home skills, safety and toileting techniques.  Anticipate patient being able to go home with assistance and able to perform activities of daily living using adaptive equipment for improved functional mobility.  Independent safe bowel and bladder function.  Able to navigate home community territory safe without injury or falls.  May require home health services including PT OT and speech therapy.    End-stage renal disease requiring hemodialysis--have consulted nephrology and appreciate their input and management of this issue.  Continue Renvela 2400 mg 3 times daily    Hypertension reasonably well-controlled this morning.  Will continue Coreg 12.5 mg twice daily Norvasc 10 mg daily.  Continue hydralazine 100 mg 3 times daily.    CHF--appears to be reasonably well compensated at this time  although he still requiring O2.  Continue Demadex 20 mg twice daily continue to manage volume with dialysis as well.    History of neuropathic pain changed gabapentin has been changed to 300 mg nightly.    BPH Flomax 0.4 mg daily    GERD continue Protonix 40 mg twice daily    Depression continue Prozac 10 mg daily Xanax 0.25 mg twice daily as needed for anxiety.    Right internal jugular thrombus--have held anticoagulation secondary to recent subdural hematomas.  VTE Prophylaxis:   Mechanical    Falls Risk Assessment high risk secondary to mental status changes, unsteady gait; generalized weakness    The patient is high risk due to the following diagnoses/reasons: Recent subdural hematoma and end-stage renal disease    Teo Guerrero MD  [facility]  10/05/24  08:16 EDT

## 2024-10-05 NOTE — THERAPY EVALUATION
Inpatient Rehabilitation - Physical Therapy Initial Evaluation        Jimmy     Patient Name: Simon Benitez  : 1940  MRN: [id number]    Today's Date: 10/5/2024                    Admit Date: 10/5/2024      Visit Dx:   No diagnosis found.    Patient Active Problem List   Diagnosis    Subdural hematoma       Past Medical History:   Diagnosis Date    ESRD (end stage renal disease) on dialysis     Hypertension        History reviewed. No pertinent surgical history.    PT ASSESSMENT (Last 12 Hours)       IRF PT Evaluation and Treatment       Row Name 10/05/24 0911          PT Time and Intention    Document Type initial evaluation;daily treatment  -LB     Mode of Treatment individual therapy;physical therapy  -LB     Patient/Family/Caregiver Comments/Observations He was admitted just after midnight earlier today and has dialysis scheduled this afternoon. He was very drowsy and tired this am but therapy could not be rescheduled since he would be off the floor later.  -LB       Row Name 10/05/24 0911          General Information    Existing Precautions/Restrictions fall;oxygen therapy device and L/min  intermittent confusion, dialysis fistula RUE  -LB       Row Name 10/05/24 0911          Previous Level of Function/Home Environm    Bathing/Grooming, Premorbid Functional Level partial assistance  -LB     Dressing, Premorbid Functional Level partial assistance  -LB     Eating/Feeding, Premorbid Functional Level independent  -LB     Toileting, Premorbid Functional Level independent  -LB     Bed Mobility, Premorbid Functional Level independent  -LB     Transfers, Premorbid Functional Level independent  -LB     Household Ambulation, Premorbid Functional Level partial assistance  used RW or cane prn  -LB     Functional Cognition, Premorbid Functional Level intermittent confusion and short term memory loss  -LB     Previous Level of Function, Premorbid pt's independence fluctuated due to dialysis and his medical  "status. he had weak days where he needed some assist, and more normal days where he was mostly independent.  -LB       Row Name 10/05/24 0911          Living Environment    Current Living Arrangements home  -LB     Home Accessibility --  one story home, two 4\" steps to enter with rail  -LB     People in Home spouse  -LB     Primary Care Provided by self;spouse/significant other  -LB       Row Name 10/05/24 0911          Home Use of Assistive/Adaptive Equipment    Equipment Currently Used at Home bp cuff;cane, straight;grab bar;pulse ox;shower chair;walker, rolling  has elevated toilet seat. pt's wife wants a w/c for the days he is unsteady or fatigued  -LB       Row Name 10/05/24 0911          Pain Scale: FACES Pre/Post-Treatment    Pain: FACES Scale, Pretreatment 4-->hurts little more  -LB     Posttreatment Pain Rating 4-->hurts little more  -LB     Pain Location - head  -LB     Pre/Posttreatment Pain Comment rest, repositioned, notified RN he asked for a tylenol  -LB       Row Name 10/05/24 0911          Cognition/Psychosocial    Affect/Mental Status (Cognition) --  very drowsy initially, some confusion at times (he was admitted around midnight and did not sleep much)  -LB     Follows Commands (Cognition) verbal cues/prompting required;physical/tactile prompts required  -LB     Personal Safety Interventions gait belt;nonskid shoes/slippers when out of bed;supervised activity  -LB       Row Name 10/05/24 0911          Range of Motion (ROM)    Range of Motion bilateral lower extremities;ROM is WFL  -LB       Row Name 10/05/24 0911          Strength (Manual Muscle Testing)    Strength (Manual Muscle Testing) --  BLE 3/5, he fatigues quickly with activity  -LB       Row Name 10/05/24 0911          Bed Mobility    Supine-Sit Wallace (Bed Mobility) minimum assist (75% patient effort);moderate assist (50% patient effort);verbal cues;nonverbal cues (demo/gesture)  -LB     Assistive Device (Bed Mobility) bed rails  " -LB     Comment, (Bed Mobility) he was very drowsy and needed increased cues  -LB       Row Name 10/05/24 0911          Bed-Chair Transfer    Bed-Chair Harney (Transfers) minimum assist (75% patient effort);verbal cues;nonverbal cues (demo/gesture)  -LB     Assistive Device (Bed-Chair Transfers) wheelchair  -LB       Row Name 10/05/24 0911          Sit-Stand Transfer    Sit-Stand Harney (Transfers) contact guard;minimum assist (75% patient effort);verbal cues;nonverbal cues (demo/gesture)  -LB     Assistive Device (Sit-Stand Transfers) wheelchair;walker, front-wheeled  -LB       Row Name 10/05/24 0911          Stand-Sit Transfer    Stand-Sit Harney (Transfers) contact guard;minimum assist (75% patient effort);verbal cues;nonverbal cues (demo/gesture)  -LB     Assistive Device (Stand-Sit Transfers) wheelchair;walker, front-wheeled  -LB       Row Name 10/05/24 0911          Gait/Stairs (Locomotion)    Harney Level (Gait) contact guard;minimum assist (75% patient effort);verbal cues;nonverbal cues (demo/gesture);1 person to manage equipment  -LB     Assistive Device (Gait) walker, front-wheeled  -LB     Distance in Feet (Gait) 110  but he had dyspnea and O2 sats dropped  -LB     Deviations/Abnormal Patterns (Gait) jay jay decreased;gait speed decreased;stride length decreased;ataxic  staggering noted  -LB     Bilateral Gait Deviations forward flexed posture  -LB       Row Name 10/05/24 0911          Safety Issues, Functional Mobility    Safety Issues Affecting Function (Mobility) --  bed and w/c exit alarms, instructed pt to not get up alone-call for assistance, intermittent confusion and somnolence  -LB     Impairments Affecting Function (Mobility) balance;cognition;coordination;endurance/activity tolerance;pain;strength;visual/perceptual  <vision  -LB     Cognitive Impairments, Mobility Safety/Performance awareness, need for assistance;safety precaution follow-through  -LB       Row Name  10/05/24 0911          Balance    Static Sitting Balance --  CGA-min A at EOB, he was also very drowsy when he first got up.  -LB     Dynamic Sitting Balance --  CGA-min A with dressing and sitting up to eat and drink  -LB     Static Standing Balance --  CGA with RW  -LB       Row Name 10/05/24 0911          Motor Skills    Therapeutic Exercise --  he was fatigued from very late admit last night and was SOB with activity so he did not perform ther ex this session. he was assisted with mobility and ADL's.  -LB       Row Name 10/05/24 0911          Positioning and Restraints    Pre-Treatment Position in bed  -LB     In Wheelchair with SLP  -LB       Row Name 10/05/24 0911          Vital Signs    Pre SpO2 (%) --  2L O2 90%, 3L 98%  -LB     Intra SpO2 (%) --  after amb 86% on 3L, dyspnea noted  -LB     Post SpO2 (%) 92  after 2 min  -LB       Row Name 10/05/24 0911          Therapy Assessment/Plan (PT)    Patient's Goals For Discharge return home  -LB       Row Name 10/05/24 0911          Therapy Assessment/Plan (PT)    PT Diagnosis (PT) B SDH, B ruben holes  -LB     Rehab Potential/Prognosis (PT) adequate, monitor progress closely  -LB     Frequency of Treatment (PT) 5 times per week  -LB     Estimated Duration of Therapy (PT) 2 weeks;3 weeks  -LB     Problem List (PT) balance;cognition;coordination;mobility;strength;pain  <endurance  -LB     Activity Limitations Related to Problem List (PT) unable to ambulate safely;unable to transfer safely  -LB     Comment, Therapy Assessment/Plan (PT) he has dialysis T/Th/S  -LB       Row Name 10/05/24 0911          Therapy Plan Review/Discharge Plan (PT)    Therapy Plan Review (PT) evaluation/treatment results reviewed;care plan/treatment goals reviewed;risks/benefits reviewed;participants voiced agreement with care plan  -LB     Anticipated Equipment Needs at Discharge (PT Eval) --  tbd  -LB     Anticipated Discharge Disposition (PT) home with assist;home with home health  -LB        Row Name 10/05/24 0911          IRF PT Goals    Bed Mobility Goal Selection (PT-IRF) bed mobility, PT goal 1  -LB     Transfer Goal Selection (PT-IRF) transfers, PT goal 1  -LB     Gait (Walking Locomotion) Goal Selection (PT-IRF) gait, PT goal 1  -LB       Row Name 10/05/24 0911          Bed Mobility Goal 1 (PT-IRF)    Activity/Assistive Device (Bed Mobility Goal 1, PT-IRF) sit to supine/supine to sit  -LB     Wright Level (Bed Mobility Goal 1, PT-IRF) modified independence  -LB     Time Frame (Bed Mobility Goal 1, PT-IRF) by discharge  -LB       Row Name 10/05/24 0911          Transfer Goal 1 (PT-IRF)    Activity/Assistive Device (Transfer Goal 1, PT-IRF) sit-to-stand/stand-to-sit;bed-to-chair/chair-to-bed  -LB     Wright Level (Transfer Goal 1, PT-IRF) modified independence  -LB     Time Frame (Transfer Goal 1, PT-IRF) by discharge  -LB       Row Name 10/05/24 0911          Gait/Walking Locomotion Goal 1 (PT-IRF)    Activity/Assistive Device (Gait/Walking Locomotion Goal 1, PT-IRF) walker, rolling  -LB     Gait/Walking Locomotion Distance Goal 1 (PT-IRF) 300'  -LB     Wright Level (Gait/Walking Locomotion Goal 1, PT-IRF) supervision required  -LB     Time Frame (Gait/Walking Locomotion Goal 1, PT-IRF) by discharge  -LB               User Key  (r) = Recorded By, (t) = Taken By, (c) = Cosigned By      Initials Name Provider Type    Deepika Donato PT Physical Therapist                     Physical Therapy Education       Title: PT OT SLP Therapies (Done)       Topic: Physical Therapy (Done)       Point: Mobility training (Done)       Learning Progress Summary             Patient Acceptance, E, VU,NR by LB at 10/5/2024 1401                         Point: Home exercise program (Done)       Learning Progress Summary             Patient Acceptance, E, VU,NR by LB at 10/5/2024 1401                         Point: Body mechanics (Done)       Learning Progress Summary             Patient  Acceptance, E, VU,NR by  at 10/5/2024 1401                         Point: Precautions (Done)       Learning Progress Summary             Patient Acceptance, E, VU,NR by  at 10/5/2024 1401                                         User Key       Initials Effective Dates Name Provider Type Discipline    KING 06/16/21 -  Deepika Puri PT Physical Therapist PT                    PT Recommendation and Plan    Planned Therapy Interventions (PT): balance training, bed mobility training, gait training, neuromuscular re-education, patient/family education, strengthening, transfer training  Frequency of Treatment (PT): 5 times per week  Anticipated Equipment Needs at Discharge (PT Eval):  (tbd)                  Time Calculation:      PT Charges       Row Name 10/05/24 1402             Time Calculation    Start Time 0745  -LB      Stop Time 0900  -LB      Time Calculation (min) 75 min  -LB      PT Received On 10/05/24  -LB      PT Goal Re-Cert Due Date 10/11/24  -LB                User Key  (r) = Recorded By, (t) = Taken By, (c) = Cosigned By      Initials Name Provider Type    Deepika Donato, PT Physical Therapist                    Therapy Charges for Today       Code Description Service Date Service Provider Modifiers Qty    59956410642 HC PT EVAL LOW COMPLEXITY 1 10/5/2024 Deepika Puri, PT GP 1    17076102367 HC GAIT TRAINING EA 15 MIN 10/5/2024 Deepika Puri, PT GP 1    31539308544 HC PT THERAPEUTIC ACT EA 15 MIN 10/5/2024 Deepika Puri, PT GP 3              PT G-Codes  AM-PAC 6 Clicks Score (PT): 10      Deepika Puri PT  10/5/2024

## 2024-10-05 NOTE — PROGRESS NOTES
Nephrology Progress Note      Subjective     10/05/2024: Patient is a 83-year-old year  end-stage renal disease patient on hemodialysis Tuesday Thursday Saturday schedule was transferred to tertiary care center after patient had mental status change, and I saw the patient on dialysis on transfer the patient to the ER where he was phosphoric to send to the main where patient had bur holes and had a bleed in the brain patient mental status is much better than what I saw patient data on dialysis patient also had right IJ thrombus which plan was to start Eliquis but no as patient had recent bleed with holding that patient had some shortness of breath is on oxygen denies any urgency frequency no fever no chills    Objective       Vital signs:     Vitals:    10/05/24 0242 10/05/24 0512 10/05/24 0722 10/05/24 0756   BP:  132/52  129/55   BP Location:       Patient Position:       Pulse:  57 62 64   Resp:  20 18 18   Temp:    98 °F (36.7 °C)   TempSrc:    Oral   SpO2: 93% 93% 94%    Weight:       Height:                 Physical Exam:    General Appearance: Alert, pleasant, answering most of the questions  Head: Normocephalic, without obvious abnormality and atraumatic.  Eyes: Conjunctivae and sclerae normal, no icterus, no pallor, and PERRLA.  Neck: No adenopathy, supple, no carotid bruit, no JVD.  Lungs: Bilateral basal crackles  Heart: Regular rhythm & normal rate, normal S1, S2 and no murmur, no rub.  Abdomen: Normal bowel sounds, no masses, no hepatomegaly, no splenomegaly, soft, nontender, and no guarding.  Extremities: Moves extremities well, trace edema, no cyanosis and no redness.  Skin: No bleeding, bruising or rash.  Neurologic: Oriented to person, place, time and situation.  Answering all the questions  Access: Right upper arm AV fistula good thrill    Laboratory Data:       CBC and coagulation:  Results from last 7 days   Lab Units 10/05/24  0113   WBC 10*3/mm3 4.34   HEMOGLOBIN g/dL 9.6*   HEMATOCRIT % 31.3*  "  MCV fL 92.1   MCHC g/dL 30.7*   PLATELETS 10*3/mm3 135*     Acid/base balance:      Renal and electrolytes:    Results from last 7 days   Lab Units 10/05/24  0113   SODIUM mmol/L 129*   POTASSIUM mmol/L 4.6   MAGNESIUM mg/dL 2.5*   CHLORIDE mmol/L 95*   CO2 mmol/L 20.5*   BUN mg/dL 31*   CREATININE mg/dL 5.41*   CALCIUM mg/dL 9.5     Estimated Creatinine Clearance: 12 mL/min (A) (by C-G formula based on SCr of 5.41 mg/dL (H)).     Liver and pancreatic function:  Results from last 7 days   Lab Units 10/05/24  0113   ALBUMIN g/dL 3.8   BILIRUBIN mg/dL 0.5   ALK PHOS U/L 131*   AST (SGOT) U/L 16   ALT (SGPT) U/L <5       Albumin Albumin   Date Value Ref Range Status   10/05/2024 3.8 3.5 - 5.2 g/dL Final      Magnesium Magnesium   Date Value Ref Range Status   10/05/2024 2.5 (H) 1.6 - 2.4 mg/dL Final          PTH               No results found for: \"PTH\"    Cardiac:      Liver and pancreatic function:  Results from last 7 days   Lab Units 10/05/24  0113   ALBUMIN g/dL 3.8   BILIRUBIN mg/dL 0.5   ALK PHOS U/L 131*   AST (SGOT) U/L 16   ALT (SGPT) U/L <5       XR chest AP portable    Result Date: 10/1/2024  Edema and effusions. Images reviewed, interpreted, and dictated by Dr. Corbin Hamm. Transcribed by Mary Melton PA-C.    CT Head Without Contrast    Result Date: 9/30/2024  Grossly stable bilateral cerebral convexity extra-axial heterogeneously hyperdense fluid collections, likely evolving bilateral subdural hemorrhages. Continued follow-up is recommended. CRITICAL RESULT: No. COMMUNICATION: Per this written report. Images personally reviewed, interpreted, and dictated by MARLIN Hyatt.    XR chest AP portable    Result Date: 9/28/2024  There has been no significant interval change  .  Continued follow-up recommended . Images reviewed, interpreted, and dictated by Dr. JOSÉ ANTONIO Guidry. Transcribed by Mary Melton, PA-C.      Medications:     albuterol sulfate HFA, 2 puff, Inhalation, 4x Daily - " RT  amLODIPine, 10 mg, Oral, Q24H  carvedilol, 12.5 mg, Oral, BID With Meals  docusate sodium, 100 mg, Oral, BID  FLUoxetine, 10 mg, Oral, Daily  fluticasone, 2 spray, Each Nare, Daily  gabapentin, 300 mg, Oral, Nightly  guaiFENesin, 600 mg, Oral, Q12H  hydrALAZINE, 100 mg, Oral, Q8H  pantoprazole, 40 mg, Oral, BID AC  sevelamer, 2,400 mg, Oral, TID With Meals  tamsulosin, 0.4 mg, Oral, Daily  torsemide, 20 mg, Oral, BID             Assessment & Plan       -End-stage renal disease on hemodialysis  -Status post bilateral subdural hematoma status post bur holes and evacuation of hematoma  -Hypertension  -History of congestive heart failure  -Hyponatremia  -BPH  -right internal jugular vein thrombus    10/05/2024  Patient is on Tuesday Thursday Saturday schedule of dialysis, will do hemodialysis today with a goal of ultrafiltration around 3 L keeping the systolic blood pressure above 120  Will repeat sodium in the morning at the patient will get ultrafiltration today  Blood pressure medication has been started  Patient has a right IJ thrombus but cannot be anticoagulated secondary to subdural hematoma      Prognosis guarded    Plan of care was discussed with the patient.  And daughter patient understood, verbalized, agreed.        S Marcio Walker MD  10/05/24  10:03 EDT

## 2024-10-05 NOTE — THERAPY EVALUATION
"Inpatient Rehabilitation - Speech Language Pathology Initial Evaluation  [facility]  Speech/Language/Cognitive Assessment     Patient Name: Simon Benitez  : 1940  MRN: [id number]  Today's Date: 10/5/2024             Admit Date: 10/5/2024     Visit Dx:  No diagnosis found.  Patient Active Problem List   Diagnosis    Subdural hematoma     Past Medical History:   Diagnosis Date    ESRD (end stage renal disease) on dialysis     Hypertension      History reviewed. No pertinent surgical history.    Simon Benitez  was seen this AM in [facility] to participate in s/l and cognitive assessment for safety/function in adls. Patient was positioned upright in wheelchair to cooperatively participate. All results and observations of this evaluation are felt to be representative of patient's current functional status.    Per chart review: \"pt is seen today for post admission physician evaluation to the inpatient rehabilitation facility.  Patient is an 83-year-old gentleman with history of end-stage renal disease requiring hemodialysis, hypertension, BPH, GERD, depression and intermittent anxiety.  Patient apparently had had concussion in the past and did take tramadol and Norco for chronic back pain in the past.  Patient apparently had had a fall in August and had initial CT examination at [facility] in [place] and there was no evidence of bleeding at that time.  Patient recently was getting dialysis and had mental status changes was difficult to arouse and was sent to the emergency department was found to have bilateral subdural hematomas.  Patient was transferred to [facility] in [facility].  When he arrived patient did have markedly elevated blood pressure and did require IV and Cardene drip.  Patient did receive 2 bur holes for evacuation of hemorrhage.  During his time in the hospital patient has had waxing and waning alertness and at times confusion.  Talking with daughter this morning " "he has some days where he is just more tired and somnolent and then has intermittent good days.  During his time at  patient did require oxygen which is new for him.  It looks that patient was on 5 to 6 L but this has been weaned down to 2 L; patient apparently had pleural effusions/pulmonary edema causing the respiratory failure..  During his stay he was diagnosed with a right internal jugular thrombus.    Obviously cannot place on anticoagulation at this time secondary to recent subdural hematomas.  Patient does have chronic normocytic anemia.  Patient has been able to work with physical therapy while at [facility] and has been ambulating in the halls according to daughter.  Consequently patient thought to be a good candidate for inpatient physical rehab      Patient continued to progress medically.  Physical therapy and Occupational therapy evaluations were completed with recommended acute inpatient rehabilitation referral for continued functional mobility intervention and reeducation.  Acute rehab referral ordered for continued medical monitoring and management post prolonged hospitalization, continued respiratory status monitoring, lab monitoring, pain mgt needs, bowel/bladder care with new medication education, skin monitoring and breakdown prevention along with ongoing medical comorbidities that require ongoing care.     The preadmission mini-FIM score as assessed by the referring facility as follows: Eating 5; grooming 4; bathing 1; dressing upper body 2; dressing lower body 4; toileting 1; transfers to bed chair and wheelchair 2; locomotion 3; bladder management 1; bowel management 1; comprehension 3; expression 3; substractions 4; problem solving 3; memory 2.\"    Social History     Socioeconomic History    Marital status:       Diet Orders (active) (From admission, onward)       Start     Ordered    10/05/24 0632  Diet: Renal; Low Potassium; Texture: Regular (IDDSI 7); Fluid Consistency: " "Thin (IDDSI 0)  Diet Effective Now         10/05/24 0632                Pt reports not feeling \"like himself\". Pt states difficulty finding his words and difficulty with \"losing my thoughts\" during conversation.     Pt is observed on 2L O2 via nasal cannula.    Receptive language skills were mildly impaired. Patient was able to id common objects and personal body parts, follow simple directives with increased difficulty w/ multi-step directives, understanding complex \"wh\" question. Pt was able to participate in conversational exchange w/o difficulties.    Expressive language skills were impaired. Patient was able to complete automatic speech tasks, complete complex oe sentences, repeat at word/sentence and multiple digit levels, as well as participate in complex conversational exchange. Pt with difficulty with confrontation naming tasks, respond to complex oe \"wh\" questions. Pt with anomia, aphasia evidenced.     Patient was independently oriented to person, place, and time. Immediate, STM, and LTM were WFL w/ patient recalling recent adls and providing personal information w/ min delays. PT processing, and problem solving were WFL w/ patient demonstrating appropriate comparing/contrasting, understanding of idiomatic language. Pt with difficulty with divergent thinking skills and thought organization.    Facial/oral structures were symmetrical upon observation. Oral mucosa were moist, pink and clean. Secretions were clear, thin, and controlled. OROM/LETY was WFL w/o lingual deviation upon protrusion. Speech intensity, clarity, and intelligibility were WFL w/o dysarthria or oral apraxia noted.    Graphic and reading skills were intact, premorbid baseline status. Patient was able to id isolated letters, simple, and moderate words, as well as sentences and small paragraphs.     Pragmatic skills were impaired. Pt w/ appropriate eye gaze patterns and visual tracking. Language was appropriate in context w/ difficulty w/ " topic initiation and maintenance. No neglect evidenced. Humor response was intact w/ appropriate affect.    Impression:  Patient presents with impaired s/l/c skills. Pt with difficulty with thought organization and topic maintenance. Pt with difficulty with generative naming and divergent thinking w/ aphasia and anomia present intermittently.      SLP Recommendation and Plan      SLP to follow up for formal s/l/c therapy.     D/w patient results and recommendations w/ verbal understanding and agreement.    D/w RN results and recommendations w/ verbal understanding and agreement.     Thank you for allowing me to participate in the care of your patient-  Mary Draper M.A., CCC-SLP   EDUCATION  The patient has been educated in the following areas:     Cognitive Impairment Communication Impairment.    Time Calculation:      Time Calculation- SLP       Row Name 10/05/24 1016             Time Calculation- [facility]    SLP Start Time 0900  -      SLP Stop Time 0945  -      SLP Time Calculation (min) 45 min  -      SLP - Next Appointment 10/07/24  -                User Key  (r) = Recorded By, (t) = Taken By, (c) = Cosigned By      Initials Name Provider Type     Mary Draper MA,CCC-SLP Speech and Language Pathologist                  Therapy Charges for Today       Code Description Service Date Service Provider Modifiers Qty    10302763258 HC ST EVAL SPEECH AND PROD W LANG  3 10/5/2024 Mary Draper MA,CCC-SLP GN 1          Mary Draper MA,CCC-SLP  10/5/2024

## 2024-10-05 NOTE — PROGRESS NOTES
Patient Assessment Instrument  Quality Indicators - Admission FY 2023    Section A. Ethnicity/ Race/Language  Ethnicity:  Race:  Preferred Language:    Section A. Transportation      Section B. Hearing and Vision        Section B. Health Literacy        Section C. Cognitive Patterns      Section C. Signs and Symptoms of Delirium (from CAM)      Section D. Mood      Section D. Social Isolation      Section LA3309. Prior Functioning      Section WM6054. Prior Device Use      Section [id number]. Self Care Performance      Section [id number]. Self Care Discharge Goals      Section [id number]. Mobility Performance     Roll Left and Right: [place] does less than half the effort. [place] lifts, holds  or supports trunk or limbs but provides less than half the effort.   Sit to Lying: [place] does less than half the effort. [place] lifts, holds or  supports trunk or limbs but provides less than half the effort.   Lying to Sitting on Side of Bed: [place] does less than half the effort. [place]  lifts, holds or supports trunk or limbs but provides less than half the effort.   Sit to Stand: [place] does less than half the effort. [place] lifts, holds or  supports trunk or limbs but provides less than half the effort.   Chair/Bed to Chair Transfer: [place] does less than half the effort. [place]  lifts, holds or supports trunk or limbs but provides less than half the effort.   Toilet Transfer [place] does less than half the effort. [place] lifts, holds or  supports trunk or limbs but provides less than half the effort.   Car Transfer: Not attempted due to medical or safety concerns.   Walk 10 Feet:   [place] does less than half the effort. [place] lifts, holds or  supports trunk or limbs but provides less than half the effort.  Walk 50 Feet with 2 Turns:   [place] does less than half the effort. [place]  lifts, holds or supports trunk or limbs but provides less than half the effort.  Walk 150 Feet:   Not attempted due to medical or safety  concerns.  Walking 10 Feet on Uneven Surfaces:   Not attempted due to medical or safety  concerns.  1 Step Over Curb or Up/Down Stair:   Not attempted due to medical or safety  concerns.  Picking up an Object:   Not attempted due to medical or safety concerns.  Uses Wheelchair/Scooter: No    Section [id number]. Mobility Discharge Goals     Sit to Stand: Patient completed the activities by themself with no assistance  from a helper.   Chair/Bed to Chair Transfer: Patient completed the activities by themself with  no assistance from a helper.   Walk Discharge Goals:   Walk 150 Feet: [place] provides verbal cues or touching/steadying assistance as  patient completes activity.    Section H. Bladder and Bowel      Section I. Active Diagnosis      Section J. Health Conditions      Section J. Health Conditions (Pain)      Section K. Swallowing/Nutritional Status    Nutritional Approaches on Admission:    Section M. Skin Conditions      Section N. Medication        Section O. Special Treatments, Procedures, and Programs    Signed by: Deepika Puri, Physical Therapist

## 2024-10-05 NOTE — PROGRESS NOTES
Inpatient Rehabilitation Functional Measures Assessment and Plan of Care    Plan of Care   Self Care    [OT] Dressing (Lower) (Active)  Current Status (10/5/2024 12:00:00 AM): mod a  Weekly Goal: min a  Discharge Goal: setup    Pain    [RN] Pain Management (Active)  Current Status (10/4/2024 3:34:00 PM): Potential for pain  Weekly Goal: No pain this week  Discharge Goal: No pain    Safety    [RN] Potential for Injury (Active)  Current Status (10/4/2024 3:34:00 PM): At risk for injury  Weekly Goal: No injury this week  Discharge Goal: No injury    Functional Measures  LESVIA Eating:  LESVIA Grooming:  LESVIA Bathing:  LESVIA Upper Body Dressing:  LESVIA Lower Body Dressing:  LESVIA Toileting:    LESVIA Bladder Management  Level of Assistance:  Frequency/Number of Accidents this Shift:    LESVIA Bowel Management  Level of Assistance:  Frequency/Number of Accidents this Shift:    LESVIA Bed/Chair/Wheelchair Transfer:  LESVIA Toilet Transfer:  LESVIA Tub/Shower Transfer:    Previously Documented Mode of Locomotion at Discharge:  LESVIA Expected Mode of Locomotion at Discharge:  LESVIA Walk/Wheelchair:  LESVIA Stairs:    LESVIA Comprehension:  LESVIA Expression:  LESVIA Social Interaction:  LESVIA Problem Solving:  LESVIA Memory:    Therapy Mode Minutes  Occupational Therapy:  Physical Therapy:  Speech Language Pathology:    Discharge Functional Goals:    Signed by: Nolvia Nguyen OT

## 2024-10-05 NOTE — PROGRESS NOTES
Rehabilitation Nursing  Inpatient Rehabilitation Plan of Care Note    Plan of Care  Copy from POCSafety    Potential for Injury (Active)  Current Status (10/12/2024 12:00:00 AM): risk of injury r/t falls  Weekly Goal: no falls this shift  Discharge Goal: no falls during hospital stay    Body Systems    Integumentary (Active)  Current Status (10/12/2024 12:00:00 AM): impaired skin risk r/t incisions and  bruising  Weekly Goal: no skin breakdown  Discharge Goal: no skin breakdown and healing incisions    Signed by: Sissy Jovel, Nurse

## 2024-10-06 LAB
ANION GAP SERPL CALCULATED.3IONS-SCNC: 9.6 MMOL/L (ref 5–15)
BASOPHILS # BLD AUTO: 0.02 10*3/MM3 (ref 0–0.2)
BASOPHILS NFR BLD AUTO: 0.5 % (ref 0–1.5)
BUN SERPL-MCNC: 20 MG/DL (ref 8–23)
BUN/CREAT SERPL: 5.1 (ref 7–25)
CALCIUM SPEC-SCNC: 8.8 MG/DL (ref 8.6–10.5)
CHLORIDE SERPL-SCNC: 95 MMOL/L (ref 98–107)
CO2 SERPL-SCNC: 26.4 MMOL/L (ref 22–29)
CREAT SERPL-MCNC: 3.95 MG/DL (ref 0.76–1.27)
DEPRECATED RDW RBC AUTO: 48 FL (ref 37–54)
EGFRCR SERPLBLD CKD-EPI 2021: 14.2 ML/MIN/1.73
EOSINOPHIL # BLD AUTO: 0.05 10*3/MM3 (ref 0–0.4)
EOSINOPHIL NFR BLD AUTO: 1.2 % (ref 0.3–6.2)
ERYTHROCYTE [DISTWIDTH] IN BLOOD BY AUTOMATED COUNT: 14.5 % (ref 12.3–15.4)
GLUCOSE SERPL-MCNC: 117 MG/DL (ref 65–99)
HCT VFR BLD AUTO: 31 % (ref 37.5–51)
HGB BLD-MCNC: 9.8 G/DL (ref 13–17.7)
IMM GRANULOCYTES # BLD AUTO: 0.02 10*3/MM3 (ref 0–0.05)
IMM GRANULOCYTES NFR BLD AUTO: 0.5 % (ref 0–0.5)
LYMPHOCYTES # BLD AUTO: 0.75 10*3/MM3 (ref 0.7–3.1)
LYMPHOCYTES NFR BLD AUTO: 18.2 % (ref 19.6–45.3)
MCH RBC QN AUTO: 28.6 PG (ref 26.6–33)
MCHC RBC AUTO-ENTMCNC: 31.6 G/DL (ref 31.5–35.7)
MCV RBC AUTO: 90.4 FL (ref 79–97)
MONOCYTES # BLD AUTO: 0.37 10*3/MM3 (ref 0.1–0.9)
MONOCYTES NFR BLD AUTO: 9 % (ref 5–12)
NEUTROPHILS NFR BLD AUTO: 2.92 10*3/MM3 (ref 1.7–7)
NEUTROPHILS NFR BLD AUTO: 70.6 % (ref 42.7–76)
NRBC BLD AUTO-RTO: 0 /100 WBC (ref 0–0.2)
PLATELET # BLD AUTO: 138 10*3/MM3 (ref 140–450)
PMV BLD AUTO: 10.1 FL (ref 6–12)
POTASSIUM SERPL-SCNC: 4 MMOL/L (ref 3.5–5.2)
RBC # BLD AUTO: 3.43 10*6/MM3 (ref 4.14–5.8)
SODIUM SERPL-SCNC: 131 MMOL/L (ref 136–145)
WBC NRBC COR # BLD AUTO: 4.13 10*3/MM3 (ref 3.4–10.8)

## 2024-10-06 PROCEDURE — 94799 UNLISTED PULMONARY SVC/PX: CPT

## 2024-10-06 PROCEDURE — 85025 COMPLETE CBC W/AUTO DIFF WBC: CPT | Performed by: INTERNAL MEDICINE

## 2024-10-06 PROCEDURE — 99232 SBSQ HOSP IP/OBS MODERATE 35: CPT | Performed by: FAMILY MEDICINE

## 2024-10-06 PROCEDURE — 80048 BASIC METABOLIC PNL TOTAL CA: CPT | Performed by: INTERNAL MEDICINE

## 2024-10-06 PROCEDURE — 94761 N-INVAS EAR/PLS OXIMETRY MLT: CPT

## 2024-10-06 PROCEDURE — 94664 DEMO&/EVAL PT USE INHALER: CPT

## 2024-10-06 RX ORDER — GABAPENTIN 100 MG/1
100 CAPSULE ORAL NIGHTLY
Status: DISCONTINUED | OUTPATIENT
Start: 2024-10-06 | End: 2024-10-08 | Stop reason: HOSPADM

## 2024-10-06 RX ORDER — TORSEMIDE 20 MG/1
40 TABLET ORAL
Status: DISCONTINUED | OUTPATIENT
Start: 2024-10-06 | End: 2024-10-08 | Stop reason: HOSPADM

## 2024-10-06 RX ADMIN — GABAPENTIN 100 MG: 100 CAPSULE ORAL at 21:08

## 2024-10-06 RX ADMIN — AMLODIPINE BESYLATE 10 MG: 10 TABLET ORAL at 09:23

## 2024-10-06 RX ADMIN — TAMSULOSIN HYDROCHLORIDE 0.4 MG: 0.4 CAPSULE ORAL at 09:23

## 2024-10-06 RX ADMIN — PANTOPRAZOLE SODIUM 40 MG: 40 TABLET, DELAYED RELEASE ORAL at 09:22

## 2024-10-06 RX ADMIN — ALBUTEROL SULFATE 2 PUFF: 90 AEROSOL, METERED RESPIRATORY (INHALATION) at 00:28

## 2024-10-06 RX ADMIN — DOCUSATE SODIUM 100 MG: 100 CAPSULE, LIQUID FILLED ORAL at 21:08

## 2024-10-06 RX ADMIN — HYDRALAZINE HYDROCHLORIDE 100 MG: 50 TABLET ORAL at 06:13

## 2024-10-06 RX ADMIN — ALBUTEROL SULFATE 2 PUFF: 90 AEROSOL, METERED RESPIRATORY (INHALATION) at 12:40

## 2024-10-06 RX ADMIN — TORSEMIDE 20 MG: 20 TABLET ORAL at 09:23

## 2024-10-06 RX ADMIN — FLUTICASONE PROPIONATE 2 SPRAY: 50 SPRAY, METERED NASAL at 12:25

## 2024-10-06 RX ADMIN — PANTOPRAZOLE SODIUM 40 MG: 40 TABLET, DELAYED RELEASE ORAL at 17:48

## 2024-10-06 RX ADMIN — HYDRALAZINE HYDROCHLORIDE 100 MG: 50 TABLET ORAL at 14:53

## 2024-10-06 RX ADMIN — SEVELAMER CARBONATE 2400 MG: 800 TABLET, FILM COATED ORAL at 17:48

## 2024-10-06 RX ADMIN — DOCUSATE SODIUM 100 MG: 100 CAPSULE, LIQUID FILLED ORAL at 09:23

## 2024-10-06 RX ADMIN — SEVELAMER CARBONATE 2400 MG: 800 TABLET, FILM COATED ORAL at 09:21

## 2024-10-06 RX ADMIN — HYDRALAZINE HYDROCHLORIDE 100 MG: 50 TABLET ORAL at 21:08

## 2024-10-06 RX ADMIN — FLUOXETINE HYDROCHLORIDE 10 MG: 10 CAPSULE ORAL at 09:23

## 2024-10-06 RX ADMIN — CARVEDILOL 12.5 MG: 6.25 TABLET, FILM COATED ORAL at 09:21

## 2024-10-06 RX ADMIN — ALBUTEROL SULFATE 2 PUFF: 90 AEROSOL, METERED RESPIRATORY (INHALATION) at 19:11

## 2024-10-06 RX ADMIN — GUAIFENESIN 600 MG: 600 TABLET ORAL at 21:08

## 2024-10-06 RX ADMIN — SEVELAMER CARBONATE 2400 MG: 800 TABLET, FILM COATED ORAL at 12:25

## 2024-10-06 RX ADMIN — ALPRAZOLAM 0.25 MG: 0.25 TABLET ORAL at 21:15

## 2024-10-06 RX ADMIN — GUAIFENESIN 600 MG: 600 TABLET ORAL at 09:23

## 2024-10-06 RX ADMIN — ALBUTEROL SULFATE 2 PUFF: 90 AEROSOL, METERED RESPIRATORY (INHALATION) at 07:23

## 2024-10-06 NOTE — PROGRESS NOTES
Rehabilitation Nursing  Inpatient Rehabilitation Plan of Care Note    Plan of Care  Copy from POCSafety    Potential for Injury (Active)  Current Status (10/12/2024 12:00:00 AM): risk of injury r/t falls  Weekly Goal: no falls this shift  Discharge Goal: no falls during hospital stay    Body Systems    Integumentary (Active)  Current Status (10/12/2024 12:00:00 AM): impaired skin risk r/t incisions and  bruising  Weekly Goal: no skin breakdown  Discharge Goal: no skin breakdown and healing incisions    Signed by: Emma Carnes, Nurse

## 2024-10-06 NOTE — PLAN OF CARE
Problem: Rehabilitation (IRF) Plan of Care  Goal: Plan of Care Review  Outcome: Progressing  Flowsheets (Taken 10/6/2024 0120)  Progress: no change  Plan of Care Reviewed With: patient  Goal: Patient-Specific Goal (Individualized)  Outcome: Progressing  Goal: Absence of New-Onset Illness or Injury  Outcome: Progressing  Intervention: Prevent Fall and Fall Injury  Description: Perform fall risk screening on admission and with change in status.  Communicate fall injury risk to the healthcare team.  Promote environmental and care measures specific to identified risk factors.  Develop strategies to prevent or minimize injury during falls and to get up safely after falling.  Perform regular intentional rounding to assess need for position change, pain assessment, personal needs.  Recent Flowsheet Documentation  Taken 10/6/2024 0000 by Roz Orosco RN  Safety Promotion/Fall Prevention:   nonskid shoes/slippers when out of bed   safety round/check completed  Taken 10/5/2024 2200 by Roz Orosco RN  Safety Promotion/Fall Prevention:   nonskid shoes/slippers when out of bed   safety round/check completed  Taken 10/5/2024 2000 by Roz Orosco RN  Safety Promotion/Fall Prevention:   nonskid shoes/slippers when out of bed   safety round/check completed  Intervention: Prevent VTE (Venous Thromboembolism)  Description: Provide ongoing assessment for signs and symptoms of VTE (venous thromboembolism).  Encourage/assist with ongoing mobilization.  Initiate and maintain compression therapy when indicated.  Monitor and provide prophylactic anticoagulation when prescribed.  Recent Flowsheet Documentation  Taken 10/5/2024 2122 by Roz Orosco, RN  VTE Prevention/Management:   sequential compression devices off   patient refused intervention   bilateral  Goal: Optimal Comfort and Wellbeing  Outcome: Progressing  Goal: Home and Community Transition Plan Established  Outcome: Progressing   Goal Outcome Evaluation:  Plan  of Care Reviewed With: patient        Progress: no change

## 2024-10-06 NOTE — PROGRESS NOTES
Nephrology Progress Note      Subjective     10/05/2024: Patient is a 83-year-old year  end-stage renal disease patient on hemodialysis Tuesday Thursday Saturday schedule was transferred to tertiary care center after patient had mental status change, and I saw the patient on dialysis on transfer the patient to the ER where he was phosphoric to send to the main where patient had bur holes and had a bleed in the brain patient mental status is much better than what I saw patient data on dialysis patient also had right IJ thrombus which plan was to start Eliquis but no as patient had recent bleed with holding that patient had some shortness of breath is on oxygen denies any urgency frequency no fever no chills    10/06/2024 Patient looks much better shortness of breath is also better although complains of some shortness of breath denies any chest pain no urgency no frequency    Objective       Vital signs:     Vitals:    10/06/24 0028 10/06/24 0613 10/06/24 0723 10/06/24 0921   BP:  154/89  131/63   BP Location:       Patient Position:       Pulse: 72 62 71 61   Resp: 22  20    Temp:       TempSrc:       SpO2: 94%  94%    Weight:       Height:            Intake/Output                         10/04/24 0701 - 10/05/24 0700 (Not Admitted) 10/05/24 0701 - 10/06/24 0700     [id number] Total [id number] 9659-9785 Total                 Intake    P.O.  --  120 120  120  -- 120    Total Intake -- 120 120 120 -- 120       Output    Urine  --  -- --  --  100 100    Dialysis  --  -- --  2000  -- 2000    Total Output -- -- -- 2000 100 2100           10/06/2024 examined and reviewed    Physical Exam:    General Appearance: Alert, pleasant, answering most of the questions  Head: Normocephalic, without obvious abnormality and atraumatic.  Eyes: Conjunctivae and sclerae normal, no icterus, no pallor, and PERRLA.  Neck: No adenopathy, supple, no carotid bruit, no JVD.  Lungs: Bilateral basal crackles  Heart: Regular rhythm &  "normal rate, normal S1, S2 and no murmur, no rub.  Abdomen: Normal bowel sounds, no masses, no hepatomegaly, no splenomegaly, soft, nontender, and no guarding.  Extremities: Moves extremities well, trace edema, no cyanosis and no redness.  Skin: No bleeding, bruising or rash.  Neurologic: Oriented to person, place, time and situation.  Answering all the questions  Access: Right upper arm AV fistula good thrill    Laboratory Data:       CBC and coagulation:  Results from last 7 days   Lab Units 10/06/24  0009 10/05/24  0113   WBC 10*3/mm3 4.13 4.34   HEMOGLOBIN g/dL 9.8* 9.6*   HEMATOCRIT % 31.0* 31.3*   MCV fL 90.4 92.1   MCHC g/dL 31.6 30.7*   PLATELETS 10*3/mm3 138* 135*     Acid/base balance:      Renal and electrolytes:    Results from last 7 days   Lab Units 10/06/24  0009 10/05/24  0113   SODIUM mmol/L 131* 129*   POTASSIUM mmol/L 4.0 4.6   MAGNESIUM mg/dL  --  2.5*   CHLORIDE mmol/L 95* 95*   CO2 mmol/L 26.4 20.5*   BUN mg/dL 20 31*   CREATININE mg/dL 3.95* 5.41*   CALCIUM mg/dL 8.8 9.5     Estimated Creatinine Clearance: 16.4 mL/min (A) (by C-G formula based on SCr of 3.95 mg/dL (H)).     Liver and pancreatic function:  Results from last 7 days   Lab Units 10/05/24  0113   ALBUMIN g/dL 3.8   BILIRUBIN mg/dL 0.5   ALK PHOS U/L 131*   AST (SGOT) U/L 16   ALT (SGPT) U/L <5       Albumin Albumin   Date Value Ref Range Status   10/05/2024 3.8 3.5 - 5.2 g/dL Final      Magnesium Magnesium   Date Value Ref Range Status   10/05/2024 2.5 (H) 1.6 - 2.4 mg/dL Final          PTH               No results found for: \"PTH\"    Cardiac:      Liver and pancreatic function:  Results from last 7 days   Lab Units 10/05/24  0113   ALBUMIN g/dL 3.8   BILIRUBIN mg/dL 0.5   ALK PHOS U/L 131*   AST (SGOT) U/L 16   ALT (SGPT) U/L <5       XR Chest 1 View    Result Date: 10/6/2024   1.  Enlarged heart size. 2.  Small right and left pleural effusion, right greater than left. 3.  Hypoinflated lungs 4.  Central pulmonary vascular " congestion. 5.  No pneumothorax.  This report was finalized on 10/6/2024 3:11 AM by Edmundo Guerra MD.      XR chest AP portable    Result Date: 10/1/2024  Edema and effusions. Images reviewed, interpreted, and dictated by Dr. Corbin Hamm. Transcribed by Mary Melton PA-C.    CT Head Without Contrast    Result Date: 9/30/2024  Grossly stable bilateral cerebral convexity extra-axial heterogeneously hyperdense fluid collections, likely evolving bilateral subdural hemorrhages. Continued follow-up is recommended. CRITICAL RESULT: No. COMMUNICATION: Per this written report. Images personally reviewed, interpreted, and dictated by MARLIN Hyatt.      Medications:     albuterol sulfate HFA, 2 puff, Inhalation, 4x Daily - RT  amLODIPine, 10 mg, Oral, Q24H  carvedilol, 12.5 mg, Oral, BID With Meals  docusate sodium, 100 mg, Oral, BID  FLUoxetine, 10 mg, Oral, Daily  fluticasone, 2 spray, Each Nare, Daily  gabapentin, 300 mg, Oral, Nightly  guaiFENesin, 600 mg, Oral, Q12H  hydrALAZINE, 100 mg, Oral, Q8H  pantoprazole, 40 mg, Oral, BID AC  sevelamer, 2,400 mg, Oral, TID With Meals  tamsulosin, 0.4 mg, Oral, Daily  torsemide, 20 mg, Oral, BID             Assessment & Plan     10/06/2024 reviewed and updated    -End-stage renal disease on hemodialysis  -Status post bilateral subdural hematoma status post bur holes and evacuation of hematoma  -Hypertension  -History of congestive heart failure  - Hypervolemic hyponatremia  -BPH  -right internal jugular vein thrombus    10/05/2024  Patient is on Tuesday Thursday Saturday schedule of dialysis, will do hemodialysis today with a goal of ultrafiltration around 3 L keeping the systolic blood pressure above 120  Will repeat sodium in the morning at the patient will get ultrafiltration today  Blood pressure medication has been started  Patient has a right IJ thrombus but cannot be anticoagulated secondary to subdural hematoma    10/06/2024  Patient tolerated dialysis well  yesterday but still complains of shortness of breath  We will start patient on torsemide 40 mg twice a day  Patient's sodium is 131 from 129 most likely hypervolemic hyponatremia and fluid restriction 1200 cc in 24 hours      Prognosis guarded    Plan of care was discussed with the patient.  And daughter patient understood, verbalized, agreed.        S Marcio Walker MD  10/06/24  09:41 EDT     N/A

## 2024-10-06 NOTE — PROGRESS NOTES
Assisted By: Deepika DINERO    CC: Follow-up on bilateral subdural hematoma status post bur hole drainage.    Interview History/HPI: Patient thinks he is doing okay although he still complains of some smothering.  No headache at the current time.  He was not wearing oxygen at home prior to these events.  Patient over the last year has been prone to falling.          Current Hospital Meds:  albuterol sulfate HFA, 2 puff, Inhalation, 4x Daily - RT  amLODIPine, 10 mg, Oral, Q24H  carvedilol, 12.5 mg, Oral, BID With Meals  docusate sodium, 100 mg, Oral, BID  FLUoxetine, 10 mg, Oral, Daily  fluticasone, 2 spray, Each Nare, Daily  gabapentin, 100 mg, Oral, Nightly  guaiFENesin, 600 mg, Oral, Q12H  hydrALAZINE, 100 mg, Oral, Q8H  pantoprazole, 40 mg, Oral, BID AC  sevelamer, 2,400 mg, Oral, TID With Meals  tamsulosin, 0.4 mg, Oral, Daily  torsemide, 40 mg, Oral, BID         Vitals:    10/06/24 1240   BP:    Pulse: 74   Resp: 20   Temp:    SpO2: 95%         Intake/Output Summary (Last 24 hours) at 10/6/2024 1435  Last data filed at 10/6/2024 1300  Gross per 24 hour   Intake 720 ml   Output 2200 ml   Net -1480 ml       EXAM: 151/74, overall blood pressure been well-controlled at the last 2 readings.  Temperature 98.4 pulse 67 respiratory rate 19 saturation 90 to 93% on 3 L lungs are clear but decreased at the bases, no crackles heard, heart is a regular rate and rhythm with 1/6 ZENY heard.  Abdomen is soft, no fluid wave.  No edema.  He can dorsi and plantarflex his feet,  strength is overall symmetric.  Patient's incision sites look good on his scalp, 4 separate lesions.  Patient has a right arm fistula, good thrill.      Diet: Renal; Low Potassium; Texture: Regular (IDDSI 7); Fluid Consistency: Thin (IDDSI 0)        LABS:     Lab Results (last 48 hours)       Procedure Component Value Units Date/Time    Basic Metabolic Panel [[id number]]  (Abnormal) Collected: 10/06/24 0009    Specimen: Blood Updated: 10/06/24 0047     Glucose  117 mg/dL      BUN 20 mg/dL      Creatinine 3.95 mg/dL      Sodium 131 mmol/L      Potassium 4.0 mmol/L      Chloride 95 mmol/L      CO2 26.4 mmol/L      Calcium 8.8 mg/dL      BUN/Creatinine Ratio 5.1     Anion Gap 9.6 mmol/L      eGFR 14.2 mL/min/1.73      Comment: <15 Indicative of kidney failure       Narrative:      GFR Normal >60  Chronic Kidney Disease <60  Kidney Failure <15    The GFR formula is only valid for adults with stable renal function between ages 18 and 70.    CBC & Differential [[id number]]  (Abnormal) Collected: 10/06/24 0009    Specimen: Blood Updated: 10/06/24 0031    Narrative:      The following orders were created for panel order CBC & Differential.  Procedure                               Abnormality         Status                     ---------                               -----------         ------                     CBC Auto Differential[[id number]]        Abnormal            Final result               Scan Slide[[id number]]                                                                    Please view results for these tests on the individual orders.    CBC Auto Differential [[id number]]  (Abnormal) Collected: 10/06/24 0009    Specimen: Blood Updated: 10/06/24 0031     WBC 4.13 10*3/mm3      RBC 3.43 10*6/mm3      Hemoglobin 9.8 g/dL      Hematocrit 31.0 %      MCV 90.4 fL      MCH 28.6 pg      MCHC 31.6 g/dL      RDW 14.5 %      RDW-SD 48.0 fl      MPV 10.1 fL      Platelets 138 10*3/mm3      Neutrophil % 70.6 %      Lymphocyte % 18.2 %      Monocyte % 9.0 %      Eosinophil % 1.2 %      Basophil % 0.5 %      Immature Grans % 0.5 %      Neutrophils, Absolute 2.92 10*3/mm3      Lymphocytes, Absolute 0.75 10*3/mm3      Monocytes, Absolute 0.37 10*3/mm3      Eosinophils, Absolute 0.05 10*3/mm3      Basophils, Absolute 0.02 10*3/mm3      Immature Grans, Absolute 0.02 10*3/mm3      nRBC 0.0 /100 WBC     Hepatitis Panel, Acute [[id number]]  (Normal) Collected: 10/05/24 1104    Specimen: Blood  Updated: 10/05/24 1145     Hepatitis B Surface Ag Non-Reactive     Hep A IgM Non-Reactive     Hep B C IgM Non-Reactive     Hepatitis C Ab Non-Reactive    Narrative:      Results may be falsely decreased if patient taking Biotin.     Comprehensive Metabolic Panel [[id number]]  (Abnormal) Collected: 10/05/24 0113    Specimen: Blood Updated: 10/05/24 0201     Glucose 107 mg/dL      BUN 31 mg/dL      Creatinine 5.41 mg/dL      Sodium 129 mmol/L      Potassium 4.6 mmol/L      Comment: Slight hemolysis detected by analyzer. Result may be falsely elevated.        Chloride 95 mmol/L      CO2 20.5 mmol/L      Calcium 9.5 mg/dL      Total Protein 6.1 g/dL      Albumin 3.8 g/dL      ALT (SGPT) <5 U/L      AST (SGOT) 16 U/L      Alkaline Phosphatase 131 U/L      Total Bilirubin 0.5 mg/dL      Globulin 2.3 gm/dL      A/G Ratio 1.7 g/dL      BUN/Creatinine Ratio 5.7     Anion Gap 13.5 mmol/L      eGFR 9.7 mL/min/1.73      Comment: <15 Indicative of kidney failure       Narrative:      GFR Normal >60  Chronic Kidney Disease <60  Kidney Failure <15    The GFR formula is only valid for adults with stable renal function between ages 18 and 70.    Magnesium [[id number]]  (Abnormal) Collected: 10/05/24 0113    Specimen: Blood Updated: 10/05/24 0201     Magnesium 2.5 mg/dL     CBC Auto Differential [[id number]]  (Abnormal) Collected: 10/05/24 0113    Specimen: Blood Updated: 10/05/24 0155     WBC 4.34 10*3/mm3      RBC 3.40 10*6/mm3      Hemoglobin 9.6 g/dL      Hematocrit 31.3 %      MCV 92.1 fL      MCH 28.2 pg      MCHC 30.7 g/dL      RDW 14.6 %      RDW-SD 49.5 fl      MPV 10.2 fL      Platelets 135 10*3/mm3      Neutrophil % 64.0 %      Lymphocyte % 23.3 %      Monocyte % 10.6 %      Eosinophil % 1.2 %      Basophil % 0.7 %      Immature Grans % 0.2 %      Neutrophils, Absolute 2.78 10*3/mm3      Lymphocytes, Absolute 1.01 10*3/mm3      Monocytes, Absolute 0.46 10*3/mm3      Eosinophils, Absolute 0.05 10*3/mm3      Basophils, Absolute  0.03 10*3/mm3      Immature Grans, Absolute 0.01 10*3/mm3      nRBC 0.0 /100 WBC                  Radiology:    Imaging Results (Last 72 Hours)       Procedure Component Value Units Date/Time    XR Chest 1 View [[id number]] Collected: 10/06/24 0310     Updated: 10/06/24 0313    Narrative:      PROCEDURE: Portable chest x-ray examination performed on October 5, 2024. Single view. Upright position.     HISTORY: Shortness of breath.     COMPARISON: None.     FINDINGS:     Enlarged heart size  Coarsened bronchovascular pattern to the lungs  Central pulmonary vascular congestion.  Small right and left pleural effusion, right greater than left.  Hypoinflated lungs  No fracture. No foreign body.  No free air in the upper abdomen.       Impression:         1.  Enlarged heart size.  2.  Small right and left pleural effusion, right greater than left.  3.  Hypoinflated lungs  4.  Central pulmonary vascular congestion.  5.  No pneumothorax.     This report was finalized on 10/6/2024 3:11 AM by Edmundo Guerra MD.             Chest x-ray image reviewed, he does have some bilateral pleural effusion.      Assessment/Plan:   Status post bur holes for bilateral subdural hematoma.  He had some waxing and waning mental status.  He was evaluated by speech therapy and thought to have some impaired speech-language and cognition skills.  They are going to follow him for this.  With OT, patient was max assist bathing, min assist upper body dressing, max lower body dressing, min assist grooming, mod assist toileting, min assist self-feeding.  With PT, patient is min to mod bed mobility, min assist bed to chair, min to contact-guard  sit to stand, contact-guard to min assist stand to sit, patient was able to walk 110 feet min assist but had some dyspnea and O2 sats dropped.    Respiratory failure, patient was not on oxygen at home, [facility] now [facility] L currently with borderline but adequate saturations.  Chest  x-ray as above showed  small pleural effusions hypoinflated lungs.  Continue O2, albuterol, Demadex.  Patient states he still makes some urine but not a lot.  Echo March 2024 showed an EF of 60 to 65%.  I discussed with nephrology, they offered him an extra dialysis today but he declined.    ESRD on hemodialysis.  Dialysis at nephrology discretion.  Continue Renvela.  He has a right arm fistula    Hypertension, on Coreg, Norvasc and hydralazine, appears overall controlled.    Peripheral neuropathy, continue gabapentin.    Anxiety, on Xanax that was adjusted by Dr. Shah yesterday, follow, [place] evaluation seemed okay.    BPH, continue Flomax    Right IJ thrombus on no anticoagulation due to the recent subdural hematomas.  This was most likely caused by previous dialysis catheter.    Depression, continue Prozac    Mild hyponatremia, stable (slightly improved)    Anemia, stable    Francois Coffey MD

## 2024-10-06 NOTE — PLAN OF CARE
Goal Outcome Evaluation:  Plan of Care Reviewed With: patient, daughter        Progress: no change  Outcome Evaluation: BED/CHAIR ALARM NOTED; DAUGHTER AT BEDSIDE; 1200ML/DAY FLUID RESTRICTION; NO BP/STICKS RIGHT ARM; FALL SAFETY INTERVENTION LEVELS EDUCATION GIVEN; CONTINUE PLAN OF CARE; MONITOR

## 2024-10-07 LAB
ANION GAP SERPL CALCULATED.3IONS-SCNC: 10.6 MMOL/L (ref 5–15)
BUN SERPL-MCNC: 28 MG/DL (ref 8–23)
BUN/CREAT SERPL: 5.5 (ref 7–25)
CALCIUM SPEC-SCNC: 9.4 MG/DL (ref 8.6–10.5)
CHLORIDE SERPL-SCNC: 95 MMOL/L (ref 98–107)
CO2 SERPL-SCNC: 26.4 MMOL/L (ref 22–29)
CREAT SERPL-MCNC: 5.08 MG/DL (ref 0.76–1.27)
EGFRCR SERPLBLD CKD-EPI 2021: 10.5 ML/MIN/1.73
GLUCOSE SERPL-MCNC: 86 MG/DL (ref 65–99)
POTASSIUM SERPL-SCNC: 4.3 MMOL/L (ref 3.5–5.2)
SODIUM SERPL-SCNC: 132 MMOL/L (ref 136–145)

## 2024-10-07 PROCEDURE — 25010000002 HEPARIN (PORCINE) PER 1000 UNITS: Performed by: FAMILY MEDICINE

## 2024-10-07 PROCEDURE — 97116 GAIT TRAINING THERAPY: CPT

## 2024-10-07 PROCEDURE — 94799 UNLISTED PULMONARY SVC/PX: CPT

## 2024-10-07 PROCEDURE — 97530 THERAPEUTIC ACTIVITIES: CPT

## 2024-10-07 PROCEDURE — 97110 THERAPEUTIC EXERCISES: CPT

## 2024-10-07 PROCEDURE — 97535 SELF CARE MNGMENT TRAINING: CPT

## 2024-10-07 PROCEDURE — 92507 TX SP LANG VOICE COMM INDIV: CPT | Performed by: SPEECH-LANGUAGE PATHOLOGIST

## 2024-10-07 PROCEDURE — 80048 BASIC METABOLIC PNL TOTAL CA: CPT | Performed by: INTERNAL MEDICINE

## 2024-10-07 PROCEDURE — 94664 DEMO&/EVAL PT USE INHALER: CPT

## 2024-10-07 PROCEDURE — 99232 SBSQ HOSP IP/OBS MODERATE 35: CPT | Performed by: INTERNAL MEDICINE

## 2024-10-07 PROCEDURE — 94761 N-INVAS EAR/PLS OXIMETRY MLT: CPT

## 2024-10-07 RX ORDER — ALPRAZOLAM 0.5 MG
0.5 TABLET ORAL 2 TIMES DAILY PRN
Status: DISCONTINUED | OUTPATIENT
Start: 2024-10-07 | End: 2024-10-08 | Stop reason: HOSPADM

## 2024-10-07 RX ORDER — CETIRIZINE HYDROCHLORIDE 10 MG/1
5 TABLET ORAL DAILY
Status: DISCONTINUED | OUTPATIENT
Start: 2024-10-07 | End: 2024-10-08 | Stop reason: HOSPADM

## 2024-10-07 RX ORDER — HEPARIN SODIUM 5000 [USP'U]/ML
5000 INJECTION, SOLUTION INTRAVENOUS; SUBCUTANEOUS EVERY 8 HOURS SCHEDULED
Status: DISCONTINUED | OUTPATIENT
Start: 2024-10-07 | End: 2024-10-08 | Stop reason: HOSPADM

## 2024-10-07 RX ORDER — ALPRAZOLAM 0.25 MG
0.25 TABLET ORAL ONCE
Status: COMPLETED | OUTPATIENT
Start: 2024-10-07 | End: 2024-10-07

## 2024-10-07 RX ADMIN — ALBUTEROL SULFATE 2 PUFF: 90 AEROSOL, METERED RESPIRATORY (INHALATION) at 19:13

## 2024-10-07 RX ADMIN — ALBUTEROL SULFATE 2 PUFF: 90 AEROSOL, METERED RESPIRATORY (INHALATION) at 00:44

## 2024-10-07 RX ADMIN — GABAPENTIN 100 MG: 100 CAPSULE ORAL at 21:04

## 2024-10-07 RX ADMIN — DOCUSATE SODIUM 100 MG: 100 CAPSULE, LIQUID FILLED ORAL at 09:01

## 2024-10-07 RX ADMIN — PANTOPRAZOLE SODIUM 40 MG: 40 TABLET, DELAYED RELEASE ORAL at 06:30

## 2024-10-07 RX ADMIN — GUAIFENESIN 600 MG: 600 TABLET ORAL at 09:02

## 2024-10-07 RX ADMIN — PANTOPRAZOLE SODIUM 40 MG: 40 TABLET, DELAYED RELEASE ORAL at 17:59

## 2024-10-07 RX ADMIN — SEVELAMER CARBONATE 2400 MG: 800 TABLET, FILM COATED ORAL at 12:23

## 2024-10-07 RX ADMIN — ACETAMINOPHEN 650 MG: 325 TABLET ORAL at 21:03

## 2024-10-07 RX ADMIN — ALBUTEROL SULFATE 2 PUFF: 90 AEROSOL, METERED RESPIRATORY (INHALATION) at 07:02

## 2024-10-07 RX ADMIN — TORSEMIDE 40 MG: 20 TABLET ORAL at 17:58

## 2024-10-07 RX ADMIN — TAMSULOSIN HYDROCHLORIDE 0.4 MG: 0.4 CAPSULE ORAL at 09:02

## 2024-10-07 RX ADMIN — FLUOXETINE HYDROCHLORIDE 10 MG: 10 CAPSULE ORAL at 09:02

## 2024-10-07 RX ADMIN — CETIRIZINE HYDROCHLORIDE 5 MG: 10 TABLET, FILM COATED ORAL at 17:59

## 2024-10-07 RX ADMIN — DOCUSATE SODIUM 100 MG: 100 CAPSULE, LIQUID FILLED ORAL at 21:04

## 2024-10-07 RX ADMIN — HEPARIN SODIUM 5000 UNITS: 5000 INJECTION INTRAVENOUS; SUBCUTANEOUS at 21:02

## 2024-10-07 RX ADMIN — ALBUTEROL SULFATE 2 PUFF: 90 AEROSOL, METERED RESPIRATORY (INHALATION) at 12:53

## 2024-10-07 RX ADMIN — HEPARIN SODIUM 5000 UNITS: 5000 INJECTION INTRAVENOUS; SUBCUTANEOUS at 05:31

## 2024-10-07 RX ADMIN — ALPRAZOLAM 0.25 MG: 0.25 TABLET ORAL at 01:45

## 2024-10-07 RX ADMIN — SEVELAMER CARBONATE 2400 MG: 800 TABLET, FILM COATED ORAL at 17:58

## 2024-10-07 RX ADMIN — ALPRAZOLAM 0.5 MG: 0.5 TABLET ORAL at 21:03

## 2024-10-07 RX ADMIN — CARVEDILOL 12.5 MG: 6.25 TABLET, FILM COATED ORAL at 17:59

## 2024-10-07 RX ADMIN — AMLODIPINE BESYLATE 10 MG: 10 TABLET ORAL at 09:01

## 2024-10-07 RX ADMIN — HYDRALAZINE HYDROCHLORIDE 100 MG: 50 TABLET ORAL at 21:02

## 2024-10-07 RX ADMIN — HEPARIN SODIUM 5000 UNITS: 5000 INJECTION INTRAVENOUS; SUBCUTANEOUS at 14:15

## 2024-10-07 RX ADMIN — SEVELAMER CARBONATE 2400 MG: 800 TABLET, FILM COATED ORAL at 09:01

## 2024-10-07 RX ADMIN — FLUTICASONE PROPIONATE 2 SPRAY: 50 SPRAY, METERED NASAL at 09:03

## 2024-10-07 RX ADMIN — HYDRALAZINE HYDROCHLORIDE 100 MG: 50 TABLET ORAL at 14:15

## 2024-10-07 RX ADMIN — TORSEMIDE 40 MG: 20 TABLET ORAL at 09:02

## 2024-10-07 RX ADMIN — CARVEDILOL 12.5 MG: 6.25 TABLET, FILM COATED ORAL at 09:02

## 2024-10-07 RX ADMIN — HYDRALAZINE HYDROCHLORIDE 100 MG: 50 TABLET ORAL at 05:06

## 2024-10-07 NOTE — PROGRESS NOTES
Nephrology Progress Note      Subjective     Patient stated he was not able to sleep well during the last night, he feels his nose is blocked that is why he is not getting enough air.  His shortness of breath has not changed compared to yesterday.    Objective       Vital signs:     Vitals:    10/07/24 0125 10/07/24 0506 10/07/24 0702 10/07/24 0901   BP:  162/71  151/74   BP Location:  Left arm     Patient Position:  Lying     Pulse:  66 67 67   Resp:   16    Temp:       TempSrc:       SpO2: 92% 93% 93%    Weight:       Height:            Intake/Output                         10/05/24 0701 - 10/06/24 0700 10/06/24 0701 - 10/07/24 0700     [id number] 2135-7610 Total 7546-9223 6510-1706 Total                 Intake    P.O.  120  -- 120  600  240 840    Total Intake 120 -- 120 600 240 840       Output    Urine  --  100 100  200  -- 200    Dialysis  2000  -- 2000  --  -- --    Total Output 2000 100 2100 200 -- 200             Physical Exam:    General Appearance: Not in acute distress lying on bed comfortably  Lungs: Bilateral basal crackles, bilateral decreased intensity of breath sounds  Heart: Regular rhythm & normal rate, normal S1, S2 and no murmur, no rub.  Abdomen: Normal bowel sounds, no masses, no hepatomegaly, no splenomegaly, soft, nontender, and no guarding.  Extremities: No bilateral lower extremity edema  Neurologic: Oriented to person, place, time and situation.  Answering all the questions  Access: Right upper arm AV fistula good thrill    Laboratory Data:       CBC and coagulation:  Results from last 7 days   Lab Units 10/06/24  0009 10/05/24  0113   WBC 10*3/mm3 4.13 4.34   HEMOGLOBIN g/dL 9.8* 9.6*   HEMATOCRIT % 31.0* 31.3*   MCV fL 90.4 92.1   MCHC g/dL 31.6 30.7*   PLATELETS 10*3/mm3 138* 135*     Acid/base balance:      Renal and electrolytes:    Results from last 7 days   Lab Units 10/07/24  0501 10/06/24  0009 10/05/24  0113   SODIUM mmol/L 132* 131* 129*   POTASSIUM mmol/L 4.3 4.0 4.6  "  MAGNESIUM mg/dL  --   --  2.5*   CHLORIDE mmol/L 95* 95* 95*   CO2 mmol/L 26.4 26.4 20.5*   BUN mg/dL 28* 20 31*   CREATININE mg/dL 5.08* 3.95* 5.41*   CALCIUM mg/dL 9.4 8.8 9.5     Estimated Creatinine Clearance: 12.8 mL/min (A) (by C-G formula based on SCr of 5.08 mg/dL (H)).     Liver and pancreatic function:  Results from last 7 days   Lab Units 10/05/24  0113   ALBUMIN g/dL 3.8   BILIRUBIN mg/dL 0.5   ALK PHOS U/L 131*   AST (SGOT) U/L 16   ALT (SGPT) U/L <5       Albumin Albumin   Date Value Ref Range Status   10/05/2024 3.8 3.5 - 5.2 g/dL Final      Magnesium Magnesium   Date Value Ref Range Status   10/05/2024 2.5 (H) 1.6 - 2.4 mg/dL Final          PTH               No results found for: \"PTH\"    Cardiac:      Liver and pancreatic function:  Results from last 7 days   Lab Units 10/05/24  0113   ALBUMIN g/dL 3.8   BILIRUBIN mg/dL 0.5   ALK PHOS U/L 131*   AST (SGOT) U/L 16   ALT (SGPT) U/L <5       XR Chest 1 View    Result Date: 10/6/2024   1.  Enlarged heart size. 2.  Small right and left pleural effusion, right greater than left. 3.  Hypoinflated lungs 4.  Central pulmonary vascular congestion. 5.  No pneumothorax.  This report was finalized on 10/6/2024 3:11 AM by Edmundo Guerra MD.      XR chest AP portable    Result Date: 10/1/2024  Edema and effusions. Images reviewed, interpreted, and dictated by Dr. Corbin Hamm. Transcribed by Mary Melton PA-C.    CT Head Without Contrast    Result Date: 9/30/2024  Grossly stable bilateral cerebral convexity extra-axial heterogeneously hyperdense fluid collections, likely evolving bilateral subdural hemorrhages. Continued follow-up is recommended. CRITICAL RESULT: No. COMMUNICATION: Per this written report. Images personally reviewed, interpreted, and dictated by MARLIN Hyatt.      Medications:     albuterol sulfate HFA, 2 puff, Inhalation, 4x Daily - RT  amLODIPine, 10 mg, Oral, Q24H  carvedilol, 12.5 mg, Oral, BID With Meals  docusate sodium, 100 " mg, Oral, BID  FLUoxetine, 10 mg, Oral, Daily  fluticasone, 2 spray, Each Nare, Daily  gabapentin, 100 mg, Oral, Nightly  guaiFENesin, 600 mg, Oral, Q12H  heparin (porcine), 5,000 Units, Subcutaneous, Q8H  hydrALAZINE, 100 mg, Oral, Q8H  pantoprazole, 40 mg, Oral, BID AC  sevelamer, 2,400 mg, Oral, TID With Meals  tamsulosin, 0.4 mg, Oral, Daily  torsemide, 40 mg, Oral, BID             Assessment & Plan     10/06/2024 reviewed and updated    -End-stage renal disease on hemodialysis  -Status post bilateral subdural hematoma status post bur holes and evacuation of hematoma  -Hypertension  -History of congestive heart failure  - Hypervolemic hyponatremia  -BPH  -right internal jugular vein thrombus    There is mild fluid overload clinically offered the patient an extra treatment of ultrafiltration today but he refused.  He requested nasal sprays  Educated and counseled the patient, answered all questions.    Reviewed clinical notes, radiological data, and labs.  Discussed the case in detail with primary team    Prognosis guarded          Gunner Pacheco MD  10/07/24  09:23 EDT

## 2024-10-07 NOTE — SIGNIFICANT NOTE
10/07/24 1150   Plan   Plan Spoke to spouse about team conference meeting in the am and discharge plans.  Pt to return home with spouse if possible at discharge.  Will follow.   Patient/Family in Agreement with Plan yes

## 2024-10-07 NOTE — THERAPY TREATMENT NOTE
Inpatient Rehabilitation - Speech Language Pathology Treatment Note  [facility]     Patient Name: Simon Benitez  : 1940  MRN: [id number]  Today's Date: 10/7/2024               Admit Date: 10/5/2024     Visit Dx:  No diagnosis found.  Patient Active Problem List   Diagnosis    Subdural hematoma     Past Medical History:   Diagnosis Date    ESRD (end stage renal disease) on dialysis     Hypertension      History reviewed. No pertinent surgical history.    SPEECH THERAPY PLAN OF CARE:     Simon Benitez is seen in speech office of [facility] [place] this date. Pt sitting upright in wheelchair. Pt lethargic, c/o being tired this PM.     Long Term Goal:  Patient will demonstrate wfl speech at premorbid baseline level to maximally participate in adls.   Patient will demonstrate wfl cognitive skills at premorbid baseline levels to maximally participate in adls.      Short Term Goals:  Patient will maintain topic of conversation appropriately over 5 minute conversational exchanges with 90% acc, min cues.   *Pt maintains topic of conversation with 70% acc. W/ mod cues.     Patient will answer OE questions with 90% acc and min cues for thought completion.   *Pt answers OE questions with 70% acc., mod cues for thought completion.     Patient will participate in conversational exchanges over 5 minutes with 90 acc, min cues for thought completion.   *pt participates in conversational exchanges over 5 minutes with 70% acc. Mod cues for thought completion.     4. Patient will perform divergent naming tasks w/ 10 items named in a given broad category in <1 min over three consecutive sessions.   *Not directly addressed.     5. Patient will perform divergent naming tasks w/ 5 items named in a given narrow category in <1 min over three consecutive session.  *Pt able to name for categories of personal details (family, occupations) with 80% acc, mod cues.    6. Patient will improve processing skills to perform multi-step tasks w/ 90%  accuracy w/ min cues over three consecutive skills.    *Pt follows simple 1 step directives with 90% acc., min cues.     *goals to be added/changed as functionally appropriate.       Pt becomes increasingly lethargic with falling asleep during session. SLP ends session early. Pt returned to room with pt's family at bedside.     SLP Recommendation and Plan    Continue Plan of Care    Thank you for allowing me to participate in the care of your patient-  Mary Draper M.A., CCC-SLP      Daily Summary of Progress (SLP):  (divergent/generative naming at 80% acc mod cues, thought organization at 70% acc for answer OE questions, following x1 step directives with 90% acc min cues.) (10/07/24 1300)     SLP EVALUATION (Last 72 Hours)       SLP SLC Evaluation       Row Name 10/07/24 1300                   SLP Treatment Clinical Impressions    Daily Summary of Progress (SLP) --  divergent/generative naming at 80% acc mod cues, thought organization at 70% acc for answer OE questions, following x1 step directives with 90% acc min cues.  -                  User Key  (r) = Recorded By, (t) = Taken By, (c) = Cosigned By      Initials Name Effective Dates    Mary Chawla MA,CCC-SLP 07/11/23 -                  EDUCATION  The patient has been educated in the following areas:     Cognitive Impairment Communication Impairment.    Time Calculation:      Time Calculation- SLP       Row Name 10/07/24 1357 10/07/24 1356          Time Calculation- SLP    SLP Start Time 1330  -SS --     SLP Stop Time 1355  -SS --     SLP Time Calculation (min) 25 min  -SS --     SLP - Next Appointment -- 10/08/24  -               User Key  (r) = Recorded By, (t) = Taken By, (c) = Cosigned By      Initials Name Provider Type    Mary Chawla MA,CCC-SLP Speech and Language Pathologist                  Therapy Charges for Today       Code Description Service Date Service Provider Modifiers Qty    [id number] [facility] TREATMENT SPEECH 2  10/7/2024 Mary Draper MA,BRENNA-SLP GN 1          Mary Draper MA,SHAHID  10/7/2024

## 2024-10-07 NOTE — PROGRESS NOTES
Inpatient Rehabilitation Functional Measures Assessment and Plan of Care    Plan of Care  Self Care    [OT] Dressing (Lower)(Active)  Current Status(10/07/2024): max a  Weekly Goal(10/15/2024): mod a  Discharge Goal:  min a    Functional Measures  LESVIA Eating:  LESVIA Grooming:  LESVIA Bathing:  LESVIA Upper Body Dressing:  LESVIA Lower Body Dressing:  LESVIA Toileting:    LESVIA Bladder Management  Level of Assistance:  Frequency/Number of Accidents this Shift:    LESVIA Bowel Management  Level of Assistance:  Frequency/Number of Accidents this Shift:    LESVIA Bed/Chair/Wheelchair Transfer:  LESVIA Toilet Transfer:  LESVIA Tub/Shower Transfer:    Previously Documented Mode of Locomotion at Discharge:  LESVIA Expected Mode of Locomotion at Discharge:  LESVIA Walk/Wheelchair:  LESVIA Stairs:    LESVIA Comprehension:  LESVIA Expression:  LESVIA Social Interaction:  LESVIA Problem Solving:  LESVIA Memory:    Therapy Mode Minutes  Occupational Therapy: Individual: 90 minutes.  Physical Therapy:  Speech Language Pathology:    Discharge Functional Goals:    Signed by: Anne Marie Lozano OT

## 2024-10-07 NOTE — PROGRESS NOTES
Occupational Therapy:    Physical Therapy:    Speech Language Pathology: Individual: 25 minutes.    Signed by: RAINER López

## 2024-10-07 NOTE — PROGRESS NOTES
Occupational Therapy:    Physical Therapy: Individual: 90 minutes.    Speech Language Pathology:    Signed by: Nadiya Maurer PTA

## 2024-10-07 NOTE — PROGRESS NOTES
Patient Assessment Instrument  Quality Indicators - Admission FY 2023    Section A. Ethnicity/ Race/Language  Ethnicity:  Race:  Preferred Language:    Section A. Transportation  Issues Due to Lack of Transportation:   No    Section B. Hearing and Vision  Expression of Ideas and Wants: Exhibits some difficulty with expressing needs  and ideas (e.g., some words or finishing thoughts) or speech is not clear.  Understanding Verbal and Non-Verbal Content: Usually Understands: Understands  most conversations, but misses some part/intent of message. Requires cues at  times to understand.  Ability to Hear:  Minimal difficulty - difficulty in some environments (e.g.,  when person speaks softly or setting is noisy)  Ability to See in Adequate Light:  Impaired - sees large print, but not regular  print in newspapers/books    Section B. Health Literacy        Section C. Cognitive Patterns      Section C. Signs and Symptoms of Delirium (from CAM)  Evidence of Acute Change in Mental Status:   No  Inattention: Behavior not present  Thinking Disorganized or Incoherent:   Behavior not present  Altered Level of Consciousness:   Behavior not present    Section D. Mood      Section D. Social Isolation      Section [id number]. Prior Functioning    Self Care: Patient completed all the activities by themself, with or without an  assistive device, with no assistance from a helper.  Indoor Mobility: Patient completed the activities by themself, with or without  an assistive device, with no assistance from a helper.  Stairs: Patient needed partial assistance from another person to complete  activities.  Functional Cognition: Patient needed partial assistance from another person to  complete activities.    Section [id number]. Prior Device Use  Walker    Section PL3262. Self Care Performance      Section XA0350. Self Care Discharge Goals      Section FO0653. Mobility Performance      Section VD6169. Mobility Discharge Goals      Section H. Bladder  and Bowel  Bladder Continence: Always continent (no documented incontinence).  Bowel Continence: Always continent (no documented incontinence).    Section I. Active Diagnosis  Comorbidities and Co-existing Conditions:   Patient does not have PAD, PVD, or  Diabetes Mellitus    Section J. Health Conditions  Patient has had two or more falls, or a fall with injury, in the past year.  Patient has had major surgery during the 100 days prior to admission.    Section J. Health Conditions (Pain)      Section K. Swallowing/Nutritional Status  Regular food (solids and liquids swallowed safely without supervision or  modified food or liquid consistency).  Nutritional Approaches on Admission:  Nutritional Approaches on Admission:  Therapeutic diet (e.g., low salt, diabetic, low cholesterol), None    Section M. Skin Conditions      Section N. Medication    Potential Clinically Significant Medication Issues: No issues found during  review  Patient is taking medications in the following pharmacological classification:  E. Anticoagulant An indication is noted for all medications in the Anticoagulant  drug class.  Potential Clinically Significant Medication Issues: No issues found during  review    Section O. Special Treatments, Procedures, and Programs  Oxygen Therapy: Continuous   Dialysis: Hemodialysis    Signed by: Nurse Lawrence

## 2024-10-07 NOTE — PROGRESS NOTES
Case Management  Inpatient Rehabilitation Plan of Care and Discharge Plan Note    Rehabilitation Diagnosis:  subdural hematomas (bilateral)  Date of Onset:  9-18-24    Medical Summary:  bilateral SDH with mass effect without midline shift    Plan of Care      Expected Intensity:  Average of 3 hours of therapy 5 days/week.  Interdisciplinary Team:  Interdisciplinary Team: Medical Supervision and 24 Hour Rehabilitation Nursing.,  Physical Therapy:, Occupational Therapy:, Speech and Language Therapy:, Social  Work, Therapeutic Recreation., Respiratory Therapy.  Physical Therapy Intensity/Duration: PT 1-1.5 hours per day/5 days per week  Occupational Therapy Intensity/Duration: OT 1-1.5 hours per day/5 days per week  Speech Language Pathology  Intensity/Duration: SLP 30 mins-90 mins per day/5  days per week  Estimated Length of Stay/Anticipated Discharge Date: 14 days  Anticipated Discharge Destination:  Anticipated discharge destination from inpatient rehabilitation is community  discharge with assistance. Pt plans to return home with spouse at discharge as  long as she is able to meet caregiving needs.      Based on the patient's medical and functional status, their prognosis and  expected level of functional improvement is:  good    Signed by: BASHIR Martini

## 2024-10-07 NOTE — PROGRESS NOTES
Physical Medicine and Rehabilitation  Inpatient Rehabilitation Interdisciplinary Plan of Care    Demographics            Age: 83Y            Gender: Male    Admission Date: 10/5/2024 12:02:00 AM  Rehabilitation Diagnosis:  subdural hematomas (bilateral)    Plan of Care  Anticipated Discharge Date/Estimated Length of Stay: 14 days  Anticipated Discharge Destination: Community discharge with assistance  Discharge Plan : Pt plans to return home with spouse at discharge as long as she  is able to meet caregiving needs.  Medical Necessity Expected Level Rationale: good  Intensity and Duration: an average of 3 hours/5 days per week  Medical Supervision and 24 Hour Rehab Nursing: x  Physical Therapy: x  PT Intensity/Duration: PT 1-1.5 hours per day/5 days per week  Occupational Therapy: x  OT Intensity/Duration: OT 1-1.5 hours per day/5 days per week  Speech and Language Therapy: x  SLP Intensity/Duration: SLP 30 mins-90 mins per day/5 days per week  Social Work: x  Therapeutic Recreation: x  Respiratory Therapy: x  Updated (if changes indicated)  No changes to plan.    Based on the patient's medical and functional status, their prognosis and  expected level of functional improvement is: good    Interdisciplinary Problem/Goals/Status  Mobility    [PT] Bed/Chair/Wheelchair (Active)  Current Status (10/5/2024 12:00:00 AM): min A  Weekly Goal: MI  Discharge Goal: MI    [PT] Walk (Active)  Current Status (10/5/2024 12:00:00 AM): amb 110' RW min A  Weekly Goal: amb 300' RW Sup  Discharge Goal: amb 300' RW Sup    Self Care    [OT] Dressing (Lower) (Active)  Current Status (10/7/2024 12:00:00 AM): max a  Weekly Goal: mod a  Discharge Goal:  min a    Safety    [RN] Potential for Injury (Active)  Current Status (10/12/2024 12:00:00 AM): risk of injury r/t falls  Weekly Goal: no falls this shift  Discharge Goal: no falls during hospital stay    Body Systems    [RN] Integumentary (Active)  Current Status (10/12/2024 12:00:00 AM):  impaired skin risk r/t incisions and  bruising  Weekly Goal: no skin breakdown  Discharge Goal: no skin breakdown and healing incisions    Medical Problems  ESRD, CHF, rt IJ clot, Acute resp failure, Hypomatremia    Comments:    Signed by: Francois Coffey MD

## 2024-10-07 NOTE — PLAN OF CARE
Goal Outcome Evaluation:  Plan of Care Reviewed With: patient        Progress: no change  Outcome Evaluation: BED/CHAIR ALARM NOTED; 1200ML/DAY FLUID RESTRICTION; NO BP/STICKS RIGHT ARM; FALL SAFETY INTERVENTION LEVELS EDUCATION GIVEN; CONTINUE PLAN OF CARE; MONITOR

## 2024-10-07 NOTE — PROGRESS NOTES
PPS CMG Coordinator  Inpatient Rehabilitation Admission    Ethnic Group: White.  Marital Status:  Marital Status: .    IRF Admission Date:  10/05/2024  Admission Class: Initial Rehab.  Admit From:  [facility]    Pre-Hospital Living: Home. Pre-Hospital Living  With: (2) Family/Relatives.    Payment Sources: Primary: Medicare - Medicare Advantage  Secondary: Not Listed.  Impairment Group: 02.22 Traumatic, closed injury  Date of Onset of Impairment: 09/18/2024    Etiologic Diagnosis Code(s):  Rank Code      Description  1    S06.5X0A  Traumatic subdural hemorrhage without loss of                 consciousness, initial encounter    Comorbidities:  Rank Code      Description    1    N18.6     End stage renal disease  2    I13.2     Hypertensive heart and chronic kidney disease                 with heart failure and with stage 5 chronic                 kidney disease, or end stage renal disease  3    I82.C11   Acute embolism and thrombosis of right internal                 jugular vein  4    Z99.2     Dependence on renal dialysis  5    F32.A     Depression, unspecified  6    K21.9     Gastro-esophageal reflux disease without                 esophagitis  7    M54.9     Dorsalgia, unspecified  8    G89.29    Other chronic pain  9    Z90.5     Acquired absence of kidney  10   Z79.899   Other long term (current) drug therapy  11   N40.0     Benign prostatic hyperplasia without lower                 urinary tract symptoms  12   F41.9     Anxiety disorder, unspecified  13   I50.9     Heart failure, unspecified  14   Z74.1     Need for assistance with personal care  15   X58.XXXA  Exposure to other specified factors, initial                 encounter    Height on Admission: 71 inches.  Weight on Admission: 187 pounds.    Are there any arthritis conditions recorded for Impairment Group, Etiologic  Diagnosis, or Comorbid Conditions that meet all of the regulatory requirements  for IRF classification (in 42  .29(b)(2)(x), (xi), and xii))?  No    LESVIA Bladder Accidents:  0 - Accidents.  Bladder Score = 6. Patient has not had an accident, but uses a  device/medication. urinal  LESVIA Bowel Accident: 0 -Accidents.  Bowel Score = 6. Patient has no accidents, but uses a device/medications. bowel  med    Signed by: Nurse Lawrence

## 2024-10-07 NOTE — THERAPY TREATMENT NOTE
Inpatient Rehabilitation - Occupational Therapy Treatment Note     Jimmy     Patient Name: Simon Benitez  : 1940  MRN: [id number]    Today's Date: 10/7/2024                 Admit Date: 10/5/2024       No diagnosis found.    Patient Active Problem List   Diagnosis    Subdural hematoma       Past Medical History:   Diagnosis Date    ESRD (end stage renal disease) on dialysis     Hypertension        History reviewed. No pertinent surgical history.          IRF OT ASSESSMENT FLOWSHEET (Last 12 Hours)       IRF OT Evaluation and Treatment       Row Name 10/07/24 1407          OT Time and Intention    Document Type daily treatment (P)   -AS     Mode of Treatment occupational therapy (P)   -AS     Patient Effort good (P)   -AS     Symptoms Noted During/After Treatment fatigue (P)   -AS       Row Name 10/07/24 140          General Information    Existing Precautions/Restrictions fall;oxygen therapy device and L/min (P)   intermittent confusion, dialysis fistula RUE  -AS       Row Name 10/07/24 140          Cognition/Psychosocial    Affect/Mental Status (Cognition) -- (P)   intermittent confusion  -AS     Orientation Status (Cognition) oriented x 3 (P)   -AS     Follows Commands (Cognition) verbal cues/prompting required;repetition of directions required;physical/tactile prompts required;increased processing time needed;follows one-step commands (P)   -AS       Row Name 10/07/24 140          Upper Body Dressing    [place] Level (Upper Body Dressing) moderate assist (50-74% patient effort);verbal cues;nonverbal cues (demo/gesture) (P)   -AS     Position (Upper Body Dressing) supported sitting (P)   -AS       Row Name 10/07/24 140          Motor Skills    Motor Skills coordination;functional endurance;motor control/coordination interventions (P)   -AS     Motor Control/Coordination Interventions therapeutic exercise/ROM;gross motor coordination activities;fine motor manipulation/dexterity activities (P)    BUE FMC/GMC theract, light strengthening; PRE [id number], frequent rest breaks  -AS       Row Name 10/07/24 1407          Positioning and Restraints    In Wheelchair sitting;call light within reach;encouraged to call for assist;exit alarm on;with family/caregiver (P)   in AM. sitting with SLP in PM.  -AS               User Key  (r) = Recorded By, (t) = Taken By, (c) = Cosigned By      Initials Name Effective Dates    AS Alyce Daniel, OT Student 08/30/24 -                      Occupational Therapy Education       Title: PT OT SLP Therapies (Done)       Topic: Occupational Therapy (Done)       Point: ADL training (Done)       Description:   Instruct learner(s) on proper safety adaptation and remediation techniques during self care or transfers.   Instruct in proper use of assistive devices.                  Learning Progress Summary             Patient Acceptance, E, VU,NR by AS at 10/7/2024 1416    Acceptance, E, VU,NR by HB at 10/5/2024 1216                         Point: Home exercise program (Done)       Description:   Instruct learner(s) on appropriate technique for monitoring, assisting and/or progressing therapeutic exercises/activities.                  Learning Progress Summary             Patient Acceptance, E, VU,NR by AS at 10/7/2024 1416    Acceptance, E, VU,NR by HB at 10/5/2024 1216                         Point: Precautions (Done)       Description:   Instruct learner(s) on prescribed precautions during self-care and functional transfers.                  Learning Progress Summary             Patient Acceptance, E, VU,NR by AS at 10/7/2024 1416    Acceptance, E, VU,NR by HB at 10/5/2024 1216                         Point: Body mechanics (Done)       Description:   Instruct learner(s) on proper positioning and spine alignment during self-care, functional mobility activities and/or exercises.                  Learning Progress Summary             Patient Acceptance, E, VU,NR by AS at 10/7/2024 1416     Acceptance, E, VU,NR by  at 10/5/2024 1216                                         User Key       Initials Effective Dates Name Provider Type Discipline     05/25/21 -  Nolvia Nguyen OT Occupational Therapist OT    AS 08/30/24 -  Alyce Daniel OT Student OT Student OT                        OT Recommendation and Plan                         Time Calculation:      Time Calculation- OT       Row Name 10/07/24 1418 10/07/24 1045          Time Calculation- OT    OT Start Time 1245 (P)   -AS 1045 (P)   -AS     OT Stop Time 1330 (P)   -AS 1130 (P)   -AS     OT Time Calculation (min) 45 min (P)   -AS 45 min (P)   -AS     Total Timed Code Minutes- OT 45 minute(s) (P)   -AS 45 minute(s) (P)   -AS               User Key  (r) = Recorded By, (t) = Taken By, (c) = Cosigned By      Initials Name Provider Type    AS Alyce Daniel OT Student OT Student                  Therapy Charges for Today       Code Description Service Date Service Provider Modifiers Qty    47140268493 HC OT SELF CARE/MGMT/TRAIN EA 15 MIN 10/7/2024 Alyce Daniel OT Student GO 1    52127843970 HC OT THER PROC EA 15 MIN 10/7/2024 Alyce Daniel OT Student GO 2    87733444146 HC OT THERAPEUTIC ACT EA 15 MIN 10/7/2024 Alyce Daniel OT Student GO 3                     ABDULKADIR [place]  10/7/2024

## 2024-10-07 NOTE — SIGNIFICANT NOTE
10/07/24 0905   Living Environment   People in Home spouse   Name(s) of People in Home Rhoda Benitez-spouse   Current Living Arrangements home   Potentially Unsafe Housing Conditions none   In the past 12 months has the electric, gas, oil, or water company threatened to shut off services in your home? No   Primary Care Provided by spouse/significant other;self   Provides Primary Care For no one, unable/limited ability to care for self   Family Caregiver if Needed spouse   Family Caregiver Names Rhoda Benitez-spouse   Quality of Family Relationships helpful;involved;supportive   Able to Return to Prior Arrangements yes   Living Arrangement Comments Pt is a 82 Y/O male admitted to physical rehab on 10-5-24 with dx of subdural hematoma from [facility].  Spoke to pt and daughter Deepika Puri.  Pt lives with spouse Rhoda.  Pt has three children:  Danielle Hale ([place]), Henry Benitez ([place]) and Deepika Puri ([place]).  Pt is a retired Senator.  Pt receives prison pension, social security prison income, and [employer] Medicare replacement insurance.  PCP is Dr. Jung Keller.  [facility].  Pt does not have POA or living will.  Pt was independent with ADL's sometimes prior to admission.  Spouse had to help with showering at times.  Pt  used a rolling walker or quad cane sometimes for mobility prior to admission.   Resource/Environmental Concerns   Resource/Environmental Concerns none   Transition Planning   Patient/Family Anticipates Transition to home with family   Patient/Family Anticipated Services at Transition   (To be recommended closer to discharge date.)   Transportation Anticipated family or friend will provide   Discharge Needs Assessment (IRF)   Current Outpatient/Agency/Support Group outpatient hemodialysis  (Pt did not receive home health or outpatient therapy.  Pt receives outpatient HD at [facility] [place] on Tues, Thurs, Sat  arrive at 11:00 am.)   Concerns to be Addressed adjustment to diagnosis/illness   Equipment Currently Used at Home walker, rolling;cane, quad;shower chair;bp cuff;pulse ox;cane, straight   Current Discharge Risk chronically ill   Discharge Coordination/Progress Pt plans to return home with spouse at discharge as long as she is able to meet caregiving needs.  Team conference will be held on 10-8-24.  SS will follow and assist with discharge planning.

## 2024-10-07 NOTE — THERAPY TREATMENT NOTE
Inpatient Rehabilitation - Physical Therapy Treatment Note        Jimmy     Patient Name: Simon Benitez  : 1940  MRN: [id number]    Today's Date: 10/7/2024                    Admit Date: 10/5/2024      Visit Dx:   No diagnosis found.    Patient Active Problem List   Diagnosis    Subdural hematoma       Past Medical History:   Diagnosis Date    ESRD (end stage renal disease) on dialysis     Hypertension        History reviewed. No pertinent surgical history.    PT ASSESSMENT (Last 12 Hours)       IRF PT Evaluation and Treatment       Row Name 10/07/24 [facility]          PT Time and Intention    Document Type daily treatment  -     Mode of Treatment individual therapy;physical therapy  -     Patient/Family/Caregiver Comments/Observations Pt and RN in agreement for PT. Pt LE dressing and hygeiene completed at start of Rx. Sitting exercises to tolerance and dynamic sitting balance. Le le bike x 7' on L.V 1.0. Pt walked 160' x 2 with RW and min A. O2 did drop to low 80% on 3L of O2 bumped Pt to 4L.  -       Row Name 10/07/24 1351          General Information    Existing Precautions/Restrictions fall;oxygen therapy device and L/min  intermittent confusion, dialysis fistula RUE  -       Row Name 10/07/24 1351          Living Environment    Primary Care Provided by self;spouse/significant other  -       Row Name 10/07/24 1351          Home Use of Assistive/Adaptive Equipment    Equipment Currently Used at Home bp cuff;cane, straight;grab bar;pulse ox;shower chair;walker, rolling  has elevated toilet seat. pt's wife wants a w/c for the days he is unsteady or fatigued  -       Row Name 10/07/24 1351          Pain Scale: FACES Pre/Post-Treatment    Pain: FACES Scale, Pretreatment 4-->hurts little more  -     Posttreatment Pain Rating 4-->hurts little more  -       Row Name 10/07/24 1351          Cognition/Psychosocial    Affect/Mental Status (Cognition) --  very drowsy initially, some confusion at  times (he was admitted around midnight and did not sleep much)  -     Follows Commands (Cognition) verbal cues/prompting required;physical/tactile prompts required  -     Personal Safety Interventions fall prevention program maintained;gait belt;supervised activity;nonskid shoes/slippers when out of bed  -       Row Name 10/07/24 1351          Bed Mobility    Supine-Sit [place] (Bed Mobility) minimum assist (75% patient effort);moderate assist (50% patient effort);verbal cues;nonverbal cues (demo/gesture)  -     Assistive Device (Bed Mobility) bed rails  -       Row Name 10/07/24 1356          Bed-Chair Transfer    Bed-Chair [place] (Transfers) minimum assist (75% patient effort);verbal cues;nonverbal cues (demo/gesture)  -     Assistive Device (Bed-Chair Transfers) wheelchair  -       Row Name 10/07/24 1356          Sit-Stand Transfer    Sit-Stand [place] (Transfers) contact guard;minimum assist (75% patient effort);verbal cues;nonverbal cues (demo/gesture)  -     Assistive Device (Sit-Stand Transfers) wheelchair;walker, front-wheeled  -       Row Name 10/07/24 1359          Stand-Sit Transfer    Stand-Sit [place] (Transfers) contact guard;minimum assist (75% patient effort);verbal cues;nonverbal cues (demo/gesture)  -     Assistive Device (Stand-Sit Transfers) wheelchair;walker, front-wheeled  -       Row Name 10/07/24 1355          Gait/Stairs (Locomotion)    [place] Level (Gait) minimum assist (75% patient effort);verbal cues;nonverbal cues (demo/gesture);1 person to manage equipment;1 person assist  -     Assistive Device (Gait) walker, front-wheeled  -     Distance in Feet (Gait) 160  x 2  -     Deviations/Abnormal Patterns (Gait) jay jay decreased;gait speed decreased;stride length decreased;ataxic  staggering noted  -     Bilateral Gait Deviations forward flexed posture  -       Row Name 10/07/24 1355          Safety Issues, Functional Mobility     Impairments Affecting Function (Mobility) balance;cognition;coordination;endurance/activity tolerance;pain;strength;visual/perceptual  <vision  -       Row Name 10/07/24 135          Balance    Comment, Balance Sitting: LAQ, March, Ball sq. GTB: HS curls, bean bag toss/  with reacher. Bike 7'  -       Row Name 10/07/24 135          Positioning and Restraints    Pre-Treatment Position in bed  -     Post Treatment Position wheelchair  -HC     In Wheelchair sitting;with OT  -       Row Name 10/07/24 135          Therapy Assessment/Plan (PT)    Patient's Goals For Discharge return home  -       Row Name 10/07/24 135          Therapy Assessment/Plan (PT)    Rehab Potential/Prognosis (PT) adequate, monitor progress closely  -     Frequency of Treatment (PT) 5 times per week  -     Estimated Duration of Therapy (PT) 2 weeks;3 weeks  -     Problem List (PT) balance;cognition;coordination;mobility;strength;pain  <endurance  -     Activity Limitations Related to Problem List (PT) unable to ambulate safely;unable to transfer safely  -       Row Name 10/07/24 [facility]          Therapy Plan Review/Discharge Plan (PT)    Anticipated Equipment Needs at Discharge (PT Eval) --  tbd  -     Anticipated Discharge Disposition (PT) home with assist;home with home health  -       Row Name 10/07/24 [facility]          IRF PT Goals    Bed Mobility Goal Selection (PT-IRF) bed mobility, PT goal 1  -     Transfer Goal Selection (PT-IRF) transfers, PT goal 1  -     Gait (Walking Locomotion) Goal Selection (PT-IRF) gait, PT goal 1  -       Row Name 10/07/24 [facility]          Bed Mobility Goal 1 (PT-IRF)    Activity/Assistive Device (Bed Mobility Goal 1, PT-IRF) sit to supine/supine to sit  -     [place] Level (Bed Mobility Goal 1, PT-IRF) modified independence  -     Time Frame (Bed Mobility Goal 1, PT-IRF) by discharge  -       Row Name 10/07/24 [facility]          Transfer Goal 1 (PT-IRF)    Activity/Assistive  Device (Transfer Goal 1, PT-IRF) sit-to-stand/stand-to-sit;bed-to-chair/chair-to-bed  -     [place] Level (Transfer Goal 1, PT-IRF) modified independence  -HC     Time Frame (Transfer Goal 1, PT-IRF) by discharge  -       Row Name 10/07/24 1351          Gait/Walking Locomotion Goal 1 (PT-IRF)    Activity/Assistive Device (Gait/Walking Locomotion Goal 1, PT-IRF) walker, rolling  -HC     Gait/Walking Locomotion Distance Goal 1 (PT-IRF) 300'  -HC     [place] Level (Gait/Walking Locomotion Goal 1, PT-IRF) supervision required  -HC     Time Frame (Gait/Walking Locomotion Goal 1, PT-IRF) by discharge  -               User Key  (r) = Recorded By, (t) = Taken By, (c) = Cosigned By      Initials Name Provider Type     Nadiya Maurer, VIBHA Physical Therapist Assistant                  Wound 10/06/24 1920 scalp Incision (Active)   Dressing Appearance open to air 10/07/24 1024   Closure Sutures 10/07/24 1024   Base clean;dry;pink 10/07/24 1024   Drainage Amount none 10/07/24 1024   Care, Wound other (see comments) 10/06/24 1920   Dressing Care open to air 10/07/24 1024     Physical Therapy Education       Title: PT OT SLP Therapies (Done)       Topic: Physical Therapy (Done)       Point: Mobility training (Done)       Learning Progress Summary             Patient Acceptance, E,TB,D, VU,DU,NR by  at 10/7/2024 1356    Acceptance, E, VU,NR by LB at 10/5/2024 1401                         Point: Home exercise program (Done)       Learning Progress Summary             Patient Acceptance, E,TB,D, VU,DU,NR by  at 10/7/2024 1356    Acceptance, E, VU,NR by LB at 10/5/2024 1401                         Point: Body mechanics (Done)       Learning Progress Summary             Patient Acceptance, E,TB,D, VU,DU,NR by  at 10/7/2024 1356    Acceptance, E, VU,NR by LB at 10/5/2024 1401                         Point: Precautions (Done)       Learning Progress Summary             Patient Acceptance, E,TB,D, VU,DU,NR by  HC at 10/7/2024 1356    Acceptance, E, VU,NR by LB at 10/5/2024 1401                                         User Key       Initials Effective Dates Name Provider Type Discipline    LB 06/16/21 -  Deepika Puri, PT Physical Therapist PT    HC 01/20/23 -  Nadiya Maurer PTA Physical Therapist Assistant PT                    PT Recommendation and Plan    Frequency of Treatment (PT): 5 times per week  Anticipated Equipment Needs at Discharge (PT Eval):  (tbd)                  Time Calculation:      PT Charges       Row Name 10/07/24 1356             Time Calculation    Start Time 0915  -HC      Stop Time 1045  -HC      Time Calculation (min) 90 min  -HC      PT Received On 10/07/24  -HC         Time Calculation- PT    Total Timed Code Minutes- PT 90 minute(s)  -                User Key  (r) = Recorded By, (t) = Taken By, (c) = Cosigned By      Initials Name Provider Type     Nadiya Maurer, VIBHA Physical Therapist Assistant                    Therapy Charges for Today       Code Description Service Date Service Provider Modifiers Qty    64729336445 HC GAIT TRAINING EA 15 MIN 10/7/2024 Nadiya Maurer PTA GP, CQ 2    94698760814 HC PT THER PROC EA 15 MIN 10/7/2024 Nadiya Maurer, VIBHA GP, CQ 2    75067555613 HC PT THERAPEUTIC ACT EA 15 MIN 10/7/2024 Nadiya Maurer PTA GP, CQ 2              PT G-Codes  AM-PAC 6 Clicks Score (PT): 12      Nadiya Maurer PTA  10/7/2024

## 2024-10-07 NOTE — PROGRESS NOTES
Rehabilitation Nursing  Inpatient Rehabilitation Plan of Care Note    Plan of Care  Copy from POC    Safety    Potential for Injury (Active)  Current Status (10/12/2024 12:00:00 AM): risk of injury r/t falls  Weekly Goal: no falls this shift  Discharge Goal: no falls during hospital stay    Body Systems    Integumentary (Active)  Current Status (10/12/2024 12:00:00 AM): impaired skin risk r/t incisions and  bruising  Weekly Goal: no skin breakdown  Discharge Goal: no skin breakdown and healing incisions    RN Interventions    Safety -  RN: assess mobilty every shift and prn [RN]  RN: no skid shocks, items/call bell within reach, bed alarm in place [RN]  RN: CHAIR ALARM [RN]    Body Systems -  RN: assess skin every shift and prn [RN]  RN: turn and encourgae turning every two hours [RN]    Signed by: Nurse Peewee

## 2024-10-08 VITALS
HEART RATE: 62 BPM | HEIGHT: 71 IN | BODY MASS INDEX: 26.23 KG/M2 | DIASTOLIC BLOOD PRESSURE: 64 MMHG | WEIGHT: 187.4 LBS | RESPIRATION RATE: 20 BRPM | TEMPERATURE: 98.3 F | SYSTOLIC BLOOD PRESSURE: 150 MMHG | OXYGEN SATURATION: 94 %

## 2024-10-08 LAB
ANION GAP SERPL CALCULATED.3IONS-SCNC: 11.7 MMOL/L (ref 5–15)
BUN SERPL-MCNC: 36 MG/DL (ref 8–23)
BUN/CREAT SERPL: 6 (ref 7–25)
CALCIUM SPEC-SCNC: 9.3 MG/DL (ref 8.6–10.5)
CHLORIDE SERPL-SCNC: 97 MMOL/L (ref 98–107)
CO2 SERPL-SCNC: 25.3 MMOL/L (ref 22–29)
CREAT SERPL-MCNC: 6.01 MG/DL (ref 0.76–1.27)
EGFRCR SERPLBLD CKD-EPI 2021: 8.6 ML/MIN/1.73
GLUCOSE SERPL-MCNC: 83 MG/DL (ref 65–99)
POTASSIUM SERPL-SCNC: 4.7 MMOL/L (ref 3.5–5.2)
SODIUM SERPL-SCNC: 134 MMOL/L (ref 136–145)

## 2024-10-08 PROCEDURE — 94799 UNLISTED PULMONARY SVC/PX: CPT

## 2024-10-08 PROCEDURE — 25010000002 HEPARIN (PORCINE) PER 1000 UNITS: Performed by: FAMILY MEDICINE

## 2024-10-08 PROCEDURE — 94760 N-INVAS EAR/PLS OXIMETRY 1: CPT

## 2024-10-08 PROCEDURE — 94664 DEMO&/EVAL PT USE INHALER: CPT

## 2024-10-08 PROCEDURE — 80048 BASIC METABOLIC PNL TOTAL CA: CPT | Performed by: INTERNAL MEDICINE

## 2024-10-08 RX ORDER — HYDRALAZINE HYDROCHLORIDE 100 MG/1
100 TABLET, FILM COATED ORAL EVERY 8 HOURS SCHEDULED
Qty: 90 TABLET | Refills: 0 | Status: SHIPPED | OUTPATIENT
Start: 2024-10-08

## 2024-10-08 RX ORDER — CARVEDILOL 12.5 MG/1
12.5 TABLET ORAL 2 TIMES DAILY WITH MEALS
Qty: 60 TABLET | Refills: 0 | Status: SHIPPED | OUTPATIENT
Start: 2024-10-08

## 2024-10-08 RX ORDER — SEVELAMER CARBONATE 800 MG/1
2400 TABLET, FILM COATED ORAL
Qty: 270 TABLET | Refills: 0 | Status: SHIPPED | OUTPATIENT
Start: 2024-10-08 | End: 2024-10-08

## 2024-10-08 RX ORDER — TORSEMIDE 20 MG/1
40 TABLET ORAL 2 TIMES DAILY
Qty: 56 TABLET | Refills: 0 | Status: SHIPPED | OUTPATIENT
Start: 2024-10-08

## 2024-10-08 RX ORDER — GABAPENTIN 100 MG/1
100 CAPSULE ORAL NIGHTLY
Qty: 30 CAPSULE | Refills: 0 | Status: SHIPPED | OUTPATIENT
Start: 2024-10-08

## 2024-10-08 RX ORDER — SEVELAMER CARBONATE FOR ORAL SUSPENSION 800 MG/1
2400 POWDER, FOR SUSPENSION ORAL
Qty: 270 EACH | Refills: 0 | Status: SHIPPED | OUTPATIENT
Start: 2024-10-08

## 2024-10-08 RX ADMIN — AMLODIPINE BESYLATE 10 MG: 10 TABLET ORAL at 08:28

## 2024-10-08 RX ADMIN — ALBUTEROL SULFATE 2 PUFF: 90 AEROSOL, METERED RESPIRATORY (INHALATION) at 00:25

## 2024-10-08 RX ADMIN — CARVEDILOL 12.5 MG: 6.25 TABLET, FILM COATED ORAL at 08:27

## 2024-10-08 RX ADMIN — CARVEDILOL 12.5 MG: 6.25 TABLET, FILM COATED ORAL at 18:30

## 2024-10-08 RX ADMIN — DOCUSATE SODIUM 100 MG: 100 CAPSULE, LIQUID FILLED ORAL at 08:28

## 2024-10-08 RX ADMIN — TORSEMIDE 40 MG: 20 TABLET ORAL at 08:29

## 2024-10-08 RX ADMIN — SEVELAMER CARBONATE 2400 MG: 800 TABLET, FILM COATED ORAL at 08:28

## 2024-10-08 RX ADMIN — PANTOPRAZOLE SODIUM 40 MG: 40 TABLET, DELAYED RELEASE ORAL at 06:30

## 2024-10-08 RX ADMIN — TAMSULOSIN HYDROCHLORIDE 0.4 MG: 0.4 CAPSULE ORAL at 08:28

## 2024-10-08 RX ADMIN — FLUTICASONE PROPIONATE 2 SPRAY: 50 SPRAY, METERED NASAL at 08:27

## 2024-10-08 RX ADMIN — TORSEMIDE 40 MG: 20 TABLET ORAL at 18:30

## 2024-10-08 RX ADMIN — CETIRIZINE HYDROCHLORIDE 5 MG: 10 TABLET, FILM COATED ORAL at 08:28

## 2024-10-08 RX ADMIN — ALBUTEROL SULFATE 2 PUFF: 90 AEROSOL, METERED RESPIRATORY (INHALATION) at 09:08

## 2024-10-08 RX ADMIN — HEPARIN SODIUM 5000 UNITS: 5000 INJECTION INTRAVENOUS; SUBCUTANEOUS at 13:52

## 2024-10-08 RX ADMIN — HEPARIN SODIUM 5000 UNITS: 5000 INJECTION INTRAVENOUS; SUBCUTANEOUS at 05:38

## 2024-10-08 RX ADMIN — PANTOPRAZOLE SODIUM 40 MG: 40 TABLET, DELAYED RELEASE ORAL at 18:30

## 2024-10-08 RX ADMIN — FLUOXETINE HYDROCHLORIDE 10 MG: 10 CAPSULE ORAL at 08:28

## 2024-10-08 NOTE — SIGNIFICANT NOTE
10/08/24 1300   Plan   Plan Spoke to daughter Deepika who has talked to her mother and she is agreeable to take pt home this pm after pt receives HD.  Home health, W/C, fully electric hospital bed and home O2 are needed.  Family can pay upcharge fee for fully electric hospital bed since insurance only pays for semi-electric bed.   Patient/Family in Agreement with Plan yes

## 2024-10-08 NOTE — SIGNIFICANT NOTE
10/08/24 1353   PT Discharge Summary   Reason for Discharge Discharge from facility   Outcomes Achieved   (Patient only participated in PT x 2 days before discharge; therefore, no PT goals met during this admission.)   Discharge Destination Home with assist;Home with home health

## 2024-10-08 NOTE — PROGRESS NOTES
Patient Assessment Instrument  Quality Indicators - Discharge FY 2023    Section A. Transportation      Section A. Medication List        Section B. Health Literacy      Section C. BIMS      Section C. Signs and Symptoms of Delirium (from CAM)      Section D. Mood      Section D. Social Isolation      Section GG. Self-Care Performance      Section GG. Mobility Performance     Roll Left and Right: [place] does less than half the effort. Dorothy lifts, holds  or supports trunk or limbs but provides less than half the effort.   Sit to Lying: [place] does less than half the effort. Dorothy lifts, holds or  supports trunk or limbs but provides less than half the effort.   Lying to Sitting on Side of Bed: Dorothy does less than half the effort. Dorothy  lifts, holds or supports trunk or limbs but provides less than half the effort.   Sit to Stand: Dorothy does less than half the effort. Dorothy lifts, holds or  supports trunk or limbs but provides less than half the effort.   Chair/Bed to Chair Transfer: Dorothy does less than half the effort. Dorothy  lifts, holds or supports trunk or limbs but provides less than half the effort.   Toilet Transfer Dorothy does less than half the effort. Dorothy lifts, holds or  supports trunk or limbs but provides less than half the effort.   Car Transfer: Dorothy does less than half the effort. Dorothy lifts, holds or  supports trunk or limbs but provides less than half the effort.   Walk 10 Feet:   Dorothy does less than half the effort. Dorothy lifts, holds or  supports trunk or limbs but provides less than half the effort.  Walk 50 Feet with 2 Turns:   Dorothy does less than half the effort. Dorothy  lifts, holds or supports trunk or limbs but provides less than half the effort.  Walk 150 Feet:   Dorothy does less than half the effort. Dorothy lifts, holds or  supports trunk or limbs but provides less than half the effort.  Walking 10 Feet on Uneven Surfaces:   Not attempted due to medical or  safety  concerns.  1 Step Over Curb or Up/Down Stair:   Not attempted due to medical or safety  concerns.  Picking up an Object:   Not attempted due to medical or safety concerns. Uses  Wheelchair and/or Scooter: Yes  Wheel 50 Feet with Two Turns: [place] does less than half the effort. Dorothy  lifts, holds or supports trunk or limbs but provides less than half the effort.  Type of Wheelchair or Scooter Used: Manual  Wheel 150 Feet: Dorothy does more than half the effort. Dorothy lifts or holds  trunk or limbs and provides more than half the effort.  Type of Wheelchair or Scooter Used: Manual    Section J. Health Conditions Discharge      Section J. Health Conditions (Pain)      Section K. Swallowing/Nutritional Status  Nutritional Approaches Past 7 Days:  Nutritional Approaches at Discharge:    Section M. Skin Conditions Discharge      . Current Number of Unhealed Pressure Ulcers      Section N. Medication        Section O. Special Treatments, Procedures, and Programs    Signed by: Tracey Alonso PTA

## 2024-10-08 NOTE — DISCHARGE INSTR - OTHER ORDERS
[facility] [phone] for PT, OT, SLP  and nursing.                     [facility] for hospital bed, home O2 at 3L NC continuous with portable O2, and 18 inch lightweight wheelchair with anti-tippers, swing away leg rests, basic cushion.    [facility] [phone] for outpatient hemodialysis on Tuesday, Thursday and Saturday arrive at 11:00 am/start HD at 11:15 am.  Pt will resume outpatient HD on Thursday 10-10-24.

## 2024-10-08 NOTE — SIGNIFICANT NOTE
10/08/24 2507   Plan   Plan Received voicemail from Gabby with [facility] saying pt will receive HH SLP after therapist is out of training and will notify PCP about this.  Gabby also says [facility] does not have SLP at this time.  Contacted spouse [phone] with this information who voiced understanding.  MD notified via chat message.  SLP was sent a message via chat.

## 2024-10-08 NOTE — PROGRESS NOTES
Patient Assessment Instrument  Quality Indicators - Discharge FY 2023    Section A. Transportation      Section A. Medication List        Section B. Health Literacy      Section C. BIMS      Section C. Signs and Symptoms of Delirium (from CAM)      Section D. Mood      Section D. Social Isolation      Section GG. Self-Care Performance     Eating: [place] provides verbal cues and/or touching/steadying and/or contact  guard assistance as patient completes activity.   Oral Hygiene: [place] does less than half the effort. [place] lifts, holds or  supports trunk or limbs but provides less than half the effort.   Toileting Hygiene: : [place] does less than half the effort. [place] lifts, holds  or supports trunk or limbs but provides less than half the effort.   Shower/Bathe Self: [place] does less than half the effort. [place] lifts, holds  or supports trunk or limbs but provides less than half the effort.   Upper Body Dressing: [place] does less than half the effort. [place] lifts, holds  or supports trunk or limbs but provides less than half the effort.   Lower Body Dressing: [place] does more than half the effort. [place] lifts or  holds trunk or limbs and provides more than half the effort.   Putting On/Taking Off Footwear: [place] does more than half the effort. [place]  lifts or holds trunk or limbs and provides more than half the effort.    Section GG. Mobility Performance      Section J. Health Conditions Discharge      Section J. Health Conditions (Pain)      Section K. Swallowing/Nutritional Status  Nutritional Approaches Past 7 Days:  Nutritional Approaches at Discharge:    Section M. Skin Conditions Discharge      . Current Number of Unhealed Pressure Ulcers      Section N. Medication        Section O. Special Treatments, Procedures, and Programs    Signed by: Anne Marie Lozano OT

## 2024-10-08 NOTE — SIGNIFICANT NOTE
10/08/24 0900   Plan   Plan Team conference held today.  Pt is recommended for discharge on 10-18-24, however, pt is wanting to go home today.  Spoke to daughter Deepika, who wants pt to receive HD then they can discuss plans and options with pt.  Pt wants to return home with spouse.

## 2024-10-08 NOTE — PROGRESS NOTES
Nephrology Progress Note      Subjective     Patient is relatively confused compared to yesterday, stated he is going to quit and would like to go home and will continue his dialysis as an outpatient.  Denied any chest pain but continues to have a mild shortness of breath.    Objective       Vital signs:     Vitals:    10/07/24 2102 10/08/24 0025 10/08/24 0537 10/08/24 0827   BP: 139/63  164/65 158/78   BP Location:   Left arm    Patient Position:   Lying    Pulse: 58 58 61 62   Resp:  14     Temp:       TempSrc:       SpO2:  90% 92%    Weight:       Height:            Intake/Output                         10/06/24 0701 - 10/07/24 0700 10/07/24 0701 - 10/08/24 0700     5090-8334 0438-0988 Total 2916-5370 5180-0246 Total                 Intake    P.O.  600  240 840  840  120 960    Total Intake 600 240 840 840 120 960       Output    Urine  200  -- 200  150  200 350    Total Output 200 -- 200 150 200 350             Physical Exam:    General Appearance: Not fully oriented  Lungs: Bilateral basal crackles, bilateral decreased intensity of breath sounds  Heart: Regular rhythm & normal rate, normal S1, S2 and no murmur, no rub.  Abdomen: Normal bowel sounds, no masses, no hepatomegaly, no splenomegaly, soft, nontender, and no guarding.  Extremities: No bilateral lower extremity edema  Neurologic: Unable to assess   Access: Right upper arm AV fistula good thrill    Laboratory Data:       CBC and coagulation:  Results from last 7 days   Lab Units 10/06/24  0009 10/05/24  0113   WBC 10*3/mm3 4.13 4.34   HEMOGLOBIN g/dL 9.8* 9.6*   HEMATOCRIT % 31.0* 31.3*   MCV fL 90.4 92.1   MCHC g/dL 31.6 30.7*   PLATELETS 10*3/mm3 138* 135*     Acid/base balance:      Renal and electrolytes:    Results from last 7 days   Lab Units 10/08/24  0423 10/07/24  0501 10/06/24  0009 10/05/24  0113   SODIUM mmol/L 134* 132* 131* 129*   POTASSIUM mmol/L 4.7 4.3 4.0 4.6   MAGNESIUM mg/dL  --   --   --  2.5*   CHLORIDE mmol/L 97* 95* 95* 95*   CO2  "mmol/L 25.3 26.4 26.4 20.5*   BUN mg/dL 36* 28* 20 31*   CREATININE mg/dL 6.01* 5.08* 3.95* 5.41*   CALCIUM mg/dL 9.3 9.4 8.8 9.5     Estimated Creatinine Clearance: 10.8 mL/min (A) (by C-G formula based on SCr of 6.01 mg/dL (H)).     Liver and pancreatic function:  Results from last 7 days   Lab Units 10/05/24  0113   ALBUMIN g/dL 3.8   BILIRUBIN mg/dL 0.5   ALK PHOS U/L 131*   AST (SGOT) U/L 16   ALT (SGPT) U/L <5       Albumin No results found for: \"ALBUMIN\"     Magnesium No results found for: \"MG\"         PTH               No results found for: \"PTH\"    Cardiac:      Liver and pancreatic function:  Results from last 7 days   Lab Units 10/05/24  0113   ALBUMIN g/dL 3.8   BILIRUBIN mg/dL 0.5   ALK PHOS U/L 131*   AST (SGOT) U/L 16   ALT (SGPT) U/L <5       XR Chest 1 View    Result Date: 10/6/2024   1.  Enlarged heart size. 2.  Small right and left pleural effusion, right greater than left. 3.  Hypoinflated lungs 4.  Central pulmonary vascular congestion. 5.  No pneumothorax.  This report was finalized on 10/6/2024 3:11 AM by Edmundo Guerra MD.      XR chest AP portable    Result Date: 10/1/2024  Edema and effusions. Images reviewed, interpreted, and dictated by Dr. Corbin Hamm. Transcribed by Mary Melton PA-C.    CT Head Without Contrast    Result Date: 9/30/2024  Grossly stable bilateral cerebral convexity extra-axial heterogeneously hyperdense fluid collections, likely evolving bilateral subdural hemorrhages. Continued follow-up is recommended. CRITICAL RESULT: No. COMMUNICATION: Per this written report. Images personally reviewed, interpreted, and dictated by MARLIN Hyatt.      Medications:     albuterol sulfate HFA, 2 puff, Inhalation, 4x Daily - RT  amLODIPine, 10 mg, Oral, Q24H  carvedilol, 12.5 mg, Oral, BID With Meals  cetirizine, 5 mg, Oral, Daily  docusate sodium, 100 mg, Oral, BID  FLUoxetine, 10 mg, Oral, Daily  fluticasone, 2 spray, Each Nare, Daily  gabapentin, 100 mg, Oral, " Nightly  heparin (porcine), 5,000 Units, Subcutaneous, Q8H  hydrALAZINE, 100 mg, Oral, Q8H  pantoprazole, 40 mg, Oral, BID AC  sevelamer, 2,400 mg, Oral, TID With Meals  tamsulosin, 0.4 mg, Oral, Daily  torsemide, 40 mg, Oral, BID             Assessment & Plan       -End-stage renal disease on hemodialysis  -Status post bilateral subdural hematoma status post bur holes and evacuation of hematoma  -Hypertension  -History of congestive heart failure  - Hypervolemic hyponatremia  -BPH  -right internal jugular vein thrombus    Will do 3-1/2 hours dialysis with 3 L of ultrafiltration  Talk to patient's daughter explained about differential diagnosis of mild metabolic encephalopathy    Will do a bladder scan to make sure he is not having urinary retention    Reviewed clinical notes, radiological data, and labs.  Discussed the case in detail with primary team    Prognosis guarded          Gunner Pacheco MD  10/08/24  09:09 EDT

## 2024-10-08 NOTE — THERAPY DISCHARGE NOTE
Inpatient Rehabilitation - Speech Language Pathology  Therapy Discharge /Discharge  [facility]     Patient Name: Simon Benitez  : 1940  MRN: [id number]  Today's Date: 10/8/2024     Admit Date: 10/5/2024  Simon Benitez was admitted from [facility] in [facility] to [facility]'s Inpatient Physical Rehabilitation Unit on 10/5/24 for subdural hematoma. Pt has a medical hx significant for end-stage renal disease requiring hemodialysis, hypertension, BPH, GERD, depression and intermittent anxiety.      During admission to [facility], pt was evidenced w/ severe cognitive communication deficits in the areas of executive functioning, short term and immediate memory, problem solving, and attention on 24.     Upon admission to [facility]'s IPR, pt participated in a Speech Language Cognitive Evaluation w/ noted concerns for impaired speech/language/cognitive skills w/ difficultly noted w/ thought organization and topic maintenance. He demonstrated difficulty w/ generative naming and divergent thinking w/ aphasia and anomia present intermittently.     Pt participated in formal therapy tasks to address these noted deficits w/ progress made toward goals. Pt is currently noted w/ continued deficits.     Visit Dx:    ICD-10-CM ICD-9-CM   1. Acute respiratory failure with hypoxia  J96.01 518.81   2. Subdural hematoma  S06.5XAA 432.1     Patient Active Problem List   Diagnosis    Subdural hematoma     Past Medical History:   Diagnosis Date    ESRD (end stage renal disease) on dialysis     Hypertension      History reviewed. No pertinent surgical history.    SLP Recommendation and Plan     Recommendations:   Pt is recommended to continue formal speech/language/cognitive tx to address continued deficits upon discharge from this facility.     Thank you for allowing me to participate in the care of your patient-  Giovana Mane M.S., CCC-SLP        SLP EVALUATION (Last 72 Hours)       SLP SLC Evaluation       Row Name 10/07/24  1300                   SLP Treatment Clinical Impressions    Daily Summary of Progress (SLP) --  divergent/generative naming at 80% acc mod cues, thought organization at 70% acc for answer OE questions, following x1 step directives with 90% acc min cues.  -SS                  User Key  (r) = Recorded By, (t) = Taken By, (c) = Cosigned By      Initials Name Effective Dates    SS Mary Draper MA,CCC-SLP 07/11/23 -                        EDUCATION  The patient has been educated in the following areas:   Cognitive Impairment Communication Impairment.        Time Calculation:         Giovana Mane MS CCC-SLP  10/8/2024

## 2024-10-08 NOTE — THERAPY DISCHARGE NOTE
Inpatient Rehabilitation - IRF Occupational Therapy Progress Note/Discharge   [facility]     Patient Name: Simon Benitez  : 1940  MRN: [id number]  Today's Date: 10/8/2024               Admit Date: 10/5/2024     No diagnosis found.  Patient Active Problem List   Diagnosis    Subdural hematoma     Past Medical History:   Diagnosis Date    ESRD (end stage renal disease) on dialysis     Hypertension      History reviewed. No pertinent surgical history.    IRF OT ASSESSMENT FLOWSHEET (Last 12 Hours)       IRF OT Evaluation and Treatment       Row Name 10/08/24 1410          OT Time and Intention    Document Type discharge evaluation  -BF     Mode of Treatment occupational therapy  -BF     Evaluation/Treatment Not Performed patient/family declined, not feeling well  Pt declined OT treatment this date w/ RN notified; Pt scheduled to be d/c'd this date with assist and  OT.  -BF       Row Name 10/08/24 1410          General Information    Existing Precautions/Restrictions fall;oxygen therapy device and L/min  intermittent confusion, dialysis fistula RUE  -BF       Row Name 10/08/24 1410          Bathing    Comment (Bathing) Mod A  -BF       Row Name 10/08/24 1410          Upper Body Dressing    Comment (Upper Body Dressing) Mod A  -BF       Row Name 10/08/24 1410          Lower Body Dressing    Comment (Lower Body Dressing) Max A  -BF       Row Name 10/08/24 1410          Grooming    Comment (Grooming) Min A  -BF       Row Name 10/08/24 1410          Toileting    Comment (Toileting) Mod A  -BF       Row Name 10/08/24 1410          Self-Feeding    Comment (Self-Feeding) Set-up/supervision  -BF       Row Name 10/08/24 1410          Positioning and Restraints    In Bed supine;call light within reach;encouraged to call for assist;exit alarm on;notified nsg  -BF               User Key  (r) = Recorded By, (t) = Taken By, (c) = Cosigned By      Initials Name Effective Dates    Anne Marie Rodriguez, OT 23 -                    Wound 10/06/24 1920 scalp Incision (Active)   Dressing Appearance open to air 10/08/24 1257   Closure Sutures 10/08/24 1257   Base clean;dry;pink 10/08/24 1257   Drainage Amount none 10/08/24 1257   Care, Wound other (see comments) 10/07/24 1925   Dressing Care open to air 10/08/24 1257   Sutures Removed Intact Yes 10/08/24 1400       Occupational Therapy Education       Title: PT OT SLP Therapies (Done)       Topic: Occupational Therapy (Done)       Point: ADL training (Done)       Description:   Instruct learner(s) on proper safety adaptation and remediation techniques during self care or transfers.   Instruct in proper use of assistive devices.                  Learning Progress Summary             Patient Acceptance, E, VU,NR by AS at 10/7/2024 1416    Acceptance, E, VU,NR by HB at 10/5/2024 1216                         Point: Home exercise program (Done)       Description:   Instruct learner(s) on appropriate technique for monitoring, assisting and/or progressing therapeutic exercises/activities.                  Learning Progress Summary             Patient Acceptance, E, VU,NR by AS at 10/7/2024 1416    Acceptance, E, VU,NR by HB at 10/5/2024 1216                         Point: Precautions (Done)       Description:   Instruct learner(s) on prescribed precautions during self-care and functional transfers.                  Learning Progress Summary             Patient Acceptance, E, VU,NR by AS at 10/7/2024 1416    Acceptance, E, VU,NR by HB at 10/5/2024 1216                         Point: Body mechanics (Done)       Description:   Instruct learner(s) on proper positioning and spine alignment during self-care, functional mobility activities and/or exercises.                  Learning Progress Summary             Patient Acceptance, E, VU,NR by AS at 10/7/2024 1416    Acceptance, E, VU,NR by HB at 10/5/2024 1216                                         User Key       Initials Effective Dates Name  Provider Type Discipline    HB 05/25/21 -  Nolvia Nguyen OT Occupational Therapist OT    AS 08/30/24 -  Alyce Daniel OT Student OT Student OT                    OT Recommendation and Plan  Planned Therapy Interventions (OT): activity tolerance training, adaptive equipment training, BADL retraining, IADL retraining, passive ROM/stretching, ROM/therapeutic exercise, strengthening exercise, transfer/mobility retraining           OT IRF GOALS       Row Name 10/08/24 1400 10/05/24 1154          Transfer Goal 1 (OT-IRF)    Activity/Assistive Device (Transfer Goal 1, OT-IRF) toilet  -BF toilet  -HB     [place] Level (Transfer Goal 1, OT-IRF) contact guard required  -BF contact guard required  -HB     Time Frame (Transfer Goal 1, OT-IRF) short-term goal (STG)  -BF short-term goal (STG)  -HB     Progress/Outcomes (Transfer Goal 1, OT-IRF) goal not met  -BF new goal  -HB        Transfer Goal 2 (OT-IRF)    Activity/Assistive Device (Transfer Goal 2, OT-IRF) toilet  -BF toilet  -HB     [place] Level (Transfer Goal 2, OT-IRF) supervision required  -BF supervision required  -HB     Time Frame (Transfer Goal 2, OT-IRF) by discharge  -BF by discharge  -HB     Progress/Outcomes (Transfer Goal 2, OT-IRF) goal not met  -BF new goal  -HB        LB Dressing Goal 1 (OT-IRF)    Activity/Device (LB Dressing Goal 1, OT-IRF) lower body dressing  -BF lower body dressing  -HB     [place] (LB Dressing Goal 1, OT-IRF) moderate assist (50-74% patient effort)  -BF moderate assist (50-74% patient effort)  -HB     Time Frame (LB Dressing Goal 1, OT-IRF) short-term goal (STG)  -BF short-term goal (STG)  -HB     Progress/Outcomes (LB Dressing Goal 1, OT-IRF) goal not met  -BF new goal  -HB        LB Dressing Goal 2 (OT-IRF)    Activity/Device (LB Dressing Goal 2, OT-IRF) lower body dressing  -BF lower body dressing  -HB     [place] (LB Dressing Goal 2, OT-IRF) minimum assist (75% or more patient effort)  -BF minimum assist  (75% or more patient effort)  -HB     Time Frame (LB Dressing Goal 2, OT-IRF) by discharge  -BF by discharge  -HB     Progress/Outcomes (LB Dressing Goal 2, OT-IRF) goal not met  -BF new goal  -HB        Toileting Goal 1 (OT-IRF)    Activity/Device (Toileting Goal 1, OT-IRF) toileting skills, all  -BF toileting skills, all  -HB     [place] Level (Toileting Goal 1, OT-IRF) minimum assist (75% or more patient effort)  -BF minimum assist (75% or more patient effort)  -HB     Progress/Outcomes (Toileting Goal 1, OT-IRF) goal not met  -BF new goal  -HB     Time Frame (Toileting Goal 1, OT-IRF) short-term goal (STG)  -BF short-term goal (STG)  -HB        Toileting Goal 2 (OT-IRF)    Activity/Device (Toileting Goal 2, OT-IRF) toileting skills, all  -BF toileting skills, all  -HB     [place] Level (Toileting Goal 2, OT-IRF) set-up required  -BF set-up required  -HB     Progress/Outcomes (Toileting Goal 2, OT-IRF) goal not met  -BF new goal  -HB     Time Frame (Toileting Goal 2, OT-IRF) by discharge  -BF by discharge  -HB               User Key  (r) = Recorded By, (t) = Taken By, (c) = Cosigned By      Initials Name Provider Type    Anne Marie Rodriguez, OT Occupational Therapist    Nolvia Garrett OT Occupational Therapist                        Time Calculation:                OT Discharge Summary  Reason for Discharge: Discharge from facility  Outcomes Achieved: Unable to make functional progress toward goals at this time  Discharge Destination: Home with assist, Home with home health    Anne Marie Lozano OT  10/8/2024

## 2024-10-08 NOTE — THERAPY DISCHARGE NOTE
Inpatient Rehabilitation - Physical Therapy Treatment Note/Discharge   [facility]     Patient Name: Simon Benitez  : 1940  MRN: [id number]  Today's Date: 10/8/2024                Admit Date: 10/5/2024    Visit Dx:  No diagnosis found.  Patient Active Problem List   Diagnosis    Subdural hematoma     Past Medical History:   Diagnosis Date    ESRD (end stage renal disease) on dialysis     Hypertension      History reviewed. No pertinent surgical history.    PT ASSESSMENT (Last 12 Hours)       IRF PT Evaluation and Treatment       Row Name 10/08/24 1300          PT Time and Intention    Document Type --  Discharge  -LL     Mode of Treatment individual therapy;physical therapy  -LL     Patient/Family/Caregiver Comments/Observations Patient being discharged from IRF to home this date with assistance of family and with home health PT.  -LL       Row Name 10/08/24 1300          General Information    Existing Precautions/Restrictions fall;oxygen therapy device and L/min  intermittent confusion, dialysis fistula RUE  -       Row Name 10/08/24 1300          Living Environment    Primary Care Provided by self;spouse/significant other  -       Row Name 10/08/24 1300          Home Use of Assistive/Adaptive Equipment    Equipment Currently Used at Home bp cuff;cane, straight;grab bar;pulse ox;shower chair;walker, rolling  has elevated toilet seat. pt's wife wants a w/c for the days he is unsteady or fatigued  -       Row Name 10/08/24 1300          Pain Scale: FACES Pre/Post-Treatment    Pain: FACES Scale, Pretreatment 4-->hurts little more  -LL     Posttreatment Pain Rating 4-->hurts little more  -LL       Row Name 10/08/24 1300          Cognition/Psychosocial    Follows Commands (Cognition) verbal cues/prompting required;physical/tactile prompts required  -LL     Personal Safety Interventions fall prevention program maintained;gait belt;nonskid shoes/slippers when out of bed;supervised activity  -LL       Row  Name 10/08/24 1300          Safety Issues, Functional Mobility    Impairments Affecting Function (Mobility) balance;cognition;coordination;endurance/activity tolerance;pain;strength;visual/perceptual  <vision  -LL       Row Name 10/08/24 1300          Positioning and Restraints    Pre-Treatment Position in bed  -LL     Post Treatment Position bed  -LL     In Bed call light within reach;encouraged to call for assist;exit alarm on;side rails up x2  -LL       Row Name 10/08/24 1300          Therapy Assessment/Plan (PT)    Patient's Goals For Discharge return home  -       Row Name 10/08/24 1300          Therapy Assessment/Plan (PT)    Rehab Potential/Prognosis (PT) adequate, monitor progress closely  -LL     Frequency of Treatment (PT) 5 times per week  -LL     Estimated Duration of Therapy (PT) 2 weeks;3 weeks  -LL     Problem List (PT) balance;cognition;coordination;mobility;strength;pain  <endurance  -LL     Activity Limitations Related to Problem List (PT) unable to ambulate safely;unable to transfer safely  -       Row Name 10/08/24 1300          Therapy Plan Review/Discharge Plan (PT)    Anticipated Equipment Needs at Discharge (PT Eval) --  tbd  -LL     Anticipated Discharge Disposition (PT) home with assist;home with home health  -       Row Name 10/08/24 1300          IRF PT Goals    Bed Mobility Goal Selection (PT-IRF) bed mobility, PT goal 1  -LL     Transfer Goal Selection (PT-IRF) transfers, PT goal 1  -LL     Gait (Walking Locomotion) Goal Selection (PT-IRF) gait, PT goal 1  -LL       Row Name 10/08/24 1300          Bed Mobility Goal 1 (PT-IRF)    Activity/Assistive Device (Bed Mobility Goal 1, PT-IRF) sit to supine/supine to sit  -LL     [place] Level (Bed Mobility Goal 1, PT-IRF) modified independence  -LL     Time Frame (Bed Mobility Goal 1, PT-IRF) by discharge  -LL     Progress/Outcomes (Bed Mobility Goal 1, PT-IRF) goal not met  -LL       Row Name 10/08/24 1300          Transfer Goal 1  (PT-IRF)    Activity/Assistive Device (Transfer Goal 1, PT-IRF) sit-to-stand/stand-to-sit;bed-to-chair/chair-to-bed  -LL     [place] Level (Transfer Goal 1, PT-IRF) modified independence  -LL     Time Frame (Transfer Goal 1, PT-IRF) by discharge  -LL     Progress/Outcomes (Transfer Goal 1, PT-IRF) goal not met  -LL       Row Name 10/08/24 1300          Gait/Walking Locomotion Goal 1 (PT-IRF)    Activity/Assistive Device (Gait/Walking Locomotion Goal 1, PT-IRF) walker, rolling  -LL     Gait/Walking Locomotion Distance Goal 1 (PT-IRF) 300'  -LL     [place] Level (Gait/Walking Locomotion Goal 1, PT-IRF) supervision required  -LL     Time Frame (Gait/Walking Locomotion Goal 1, PT-IRF) by discharge  -LL     Progress/Outcomes (Gait/Walking Locomotion Goal 1, PT-IRF) goal not met  -LL               User Key  (r) = Recorded By, (t) = Taken By, (c) = Cosigned By      Initials Name Provider Type    Tracey Gomes PTA Physical Therapist Assistant                    Physical Therapy Education       Title: PT OT SLP Therapies (Done)       Topic: Physical Therapy (Resolved)       Point: Mobility training (Resolved)       Learning Progress Summary             Patient Acceptance, E,TB,D, VU,DU,NR by HC at 10/7/2024 1356    Acceptance, E, VU,NR by LB at 10/5/2024 1401                         Point: Home exercise program (Resolved)       Learning Progress Summary             Patient Acceptance, E,TB,D, VU,DU,NR by HC at 10/7/2024 1356    Acceptance, E, VU,NR by LB at 10/5/2024 1401                         Point: Body mechanics (Resolved)       Learning Progress Summary             Patient Acceptance, E,TB,D, VU,DU,NR by HC at 10/7/2024 1356    Acceptance, E, VU,NR by LB at 10/5/2024 1401                         Point: Precautions (Resolved)       Learning Progress Summary             Patient Acceptance, E,TB,D, VU,DU,NR by HC at 10/7/2024 1356    Acceptance, E, VU,NR by LB at 10/5/2024 1401                                          User Key       Initials Effective Dates Name Provider Type Discipline    LB 06/16/21 -  Deepika Puri, PT Physical Therapist PT    HC 01/20/23 -  Nadiya Maurer, VIBHA Physical Therapist Assistant PT                    PT Recommendation and Plan  Frequency of Treatment (PT): 5 times per week  Anticipated Equipment Needs at Discharge (PT Eval):  (tbd)            Time Calculation:           PT G-Codes  AM-PAC 6 Clicks Score (PT): 12    PT Discharge Summary  Reason for Discharge: Discharge from facility  Outcomes Achieved:  (Patient only participated in PT x 2 days before discharge; therefore, no PT goals met during this admission.)  Discharge Destination: Home with assist, Home with home health    Luz Alonso PTA  10/8/2024

## 2024-10-08 NOTE — SIGNIFICANT NOTE
10/08/24 4139   Plan   Plan MD is agreeable to discharge pt home this pm after HD.  Contacted [facility] [phone] per Janes about discharge.  Pt can resume outpatient HD on Tuesday, Thursday, Saturday arrive at 11:00 am starting 10-10-24.  Spoke to spouse [phone] about discharge after HD, recommendations for home health PT,OT, SLP, hospital bed (explained upcharge fee for fully electric bed which can be up to $200), home O2 at 3L NC continuous with portable O2 tanks, 18 inch lightweight W/C with swing away leg rests, anti-tippers, basic cushion, and resuming outpatient HD at [facility] on 10-10-24.  [facility] is preferred.  Spouse does not have a DME provider preference and prefers hospital bed be delivered tomorrow.  Spouse and daughter Deepika will transport pt home after HD.   Contacted [facility] [phone] per Gabby with referral, discharge today, and need for PT 2-3 x wk x 8 wks for strengthening, endurance, gait training, transfer training, therapeutic exercise, home safety, functional mobility, OT 2-3 x wk x 8 wks for ADL re-training, home safety, functional mobility, strengthening, endurance, SLP 2-3 x wk x 8 wks for expressive language, auditory processing, ADL's, safety, nursing to monitor head incision, medication education, monitor B/P, monitor AV fistuala R arm after HD for bleeding, CBC/CMP in 2 days/call to attending.  SLP is in-training and Gabby will call back about SLP services.  Faxed face sheet, H&P, PT/OT/SLP notes/evaluations, MD progress note, discharge summary and ambulatory referral for home health with face to face.  Contacted [facility] [phone] per Yusra with discharge, need for home O2 at 3L NC continuous with portable O2 tanks, 18 inch lightweight W/C with swing away leg rests, anti-tippers, basic cushion, hospital bed and family wanting to pay upcharge fee for fully electric bed (one time fee is $200).  Portable O2 to be delivered to  rehab, W/C and O2 concentrator to be delivered to pt's home; spouse to be contacted about delivery.  Hospital bed can be delivered to pt's home on 10-9-24.  Faxed MD orders for DME, nursing note with O2 sat results on RA, face sheet, H&P, and therapy notes to [facility] via [employer].  Faxed discharge summary [facility] [phone].

## 2024-10-08 NOTE — PROGRESS NOTES
Occupational Therapy:    Physical Therapy: Individual: 0 minutes.    Speech Language Pathology:    Signed by: Tracey Alonso PTA

## 2024-10-08 NOTE — PROGRESS NOTES
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 and with  stage 5 chronic kidney disease, or end stage renal disease  I82.C11 Acute embolism and thrombosis of right internal jugular vein  Z99.2 Dependence on renal dialysis  F32.A Depression, unspecified  K21.9 Gastro-esophageal reflux disease without esophagitis  M54.9 Dorsalgia, unspecified  G89.29 Other chronic pain  Z90.5 Acquired absence of kidney  Z79.899 Other long term (current) drug therapy  N40.0 Benign prostatic hyperplasia without lower urinary tract symptoms  F41.9 Anxiety disorder, unspecified  I50.9 Heart failure, unspecified  Z74.1 Need for assistance with personal care  X58.XXXA Exposure to other specified factors, initial encounter      Plan of Care  Anticipated Discharge Date/Estimated Length of Stay: 14 days  Anticipated Discharge Destination: Community discharge with assistance  Discharge Plan : Pt plans to return home with spouse at discharge as long as she  is able to meet caregiving needs.  Medical Necessity Expected Level Rationale: good  Intensity and Duration: an average of 3 hours/5 days per week  Medical Supervision and 24 Hour Rehab Nursing: x  Physical Therapy: x  PT Intensity/Duration: PT 1-1.5 hours per day/5 days per week  Occupational Therapy: x  OT Intensity/Duration: OT 1-1.5 hours per day/5 days per week  Speech and Language Therapy: x  SLP Intensity/Duration: SLP 30 mins-90 mins per day/5 days per week  Social Work:  x  Therapeutic Recreation: x  Respiratory Therapy: x  Updated (if changes indicated)    Anticipated Discharge Date/Estimated Length of Stay:   10-18-24      Discharge Plan of Care:    Based on the patient's medical and functional status, their prognosis and  expected level of functional improvement is: fair      Interdisciplinary Problem/Goals/Status  Safety    [RN] Potential for Injury(Active)  Current Status(10/12/2024): risk of injury r/t falls  Weekly Goal(11/02/2024): no falls this shift  Discharge Goal: no falls during hospital stay        Body Systems    [RN] Integumentary(Active)  Current Status(10/12/2024): impaired skin risk r/t incisions and bruising  Weekly Goal(11/02/2024): no skin breakdown  Discharge Goal: no skin breakdown and healing incisions        Self Care    [OT] Dressing (Lower)(Active)  Current Status(10/07/2024): max a  Weekly Goal(10/15/2024): mod a  Discharge Goal:  min a        Mobility    [PT] Bed/Chair/Wheelchair(Active)  Current Status(10/05/2024): min A  Weekly Goal(10/11/2024): MI  Discharge Goal: MI    [PT] Walk(Active)  Current Status(10/05/2024): amb 110' RW min A  Weekly Goal(10/11/2024): amb 300' RW Sup  Discharge Goal: amb 300' RW Sup    Comments: Pt to return home with spouse if she is able to meet caregiving needs.    Signed by: BASHIR Martini    Physician CoSigned By: Francois Coffey 10/08/2024 15:10:17

## 2024-10-08 NOTE — PHARMACY PATIENT ASSISTANCE
Pharmacy was consulted to evaluate the cost of DOACs for patient. Both Eliquis and Xarelto are covered on the patient's insurance with a copay of $40.    Thank you,    Patricia Rose, PharmD  10/08/24  15:54 EDT

## 2024-10-08 NOTE — PROGRESS NOTES
"Patient Assessment Instrument  Quality Indicators - Discharge FY 2023    Section A. Transportation      Section A. Medication List  Subsequent Provider:  06 - Home under care of organized home health service  organization  Medication List to Subsequent Provider at Discharge:  No - Current reconciled  medication list not provided to the subsequent provider  Medication List to Patient:  Not applicable.  Patient discharged to a subsequent  provider as defined in 44D    Section B. Health Literacy  Frequency of Needing Assistance Reading:  Often    Section C. BIMS  Brief Interview for Mental Status (BIMS) was conducted.  Repetition of Three Words: Three words  Able to report correct year: Correct  Able to report correct month: Accurate within 5 days  Able to report correct day of the week: Correct  Able to recall \"sock\": Yes, no cue required  Able to recall \"blue\": Yes, no cue required  Able to recall \"bed\": Yes, no cue required    BIMS SUMMARY SCORE: 15 Cognitively intact    Section C. Signs and Symptoms of Delirium (from CAM)  Acute Change in Mental Status:   No  Inattention:   Behavior not present  Disorganized Thinking:   Behavior not present  Altered Level of Consciousness:   Behavior not present    Section D. Mood  Presence of little interest or pleasure in doing things:   No  Frequency of having little interest or pleasure in doing things:   Never or 1  day  Presence of feeling down, depressed, or hopeless:   No  Frequency of feeling down, depressed, or hopeless:   Never or 1 day   Interview Ended. Above responses do not meet criteria to continue  Total Severity Score:   00    Section D. Social Isolation  Frequency of Feeling Lonely or Isolated:  Never    Section GG. Self-Care Performance      Section GG. Mobility Performance      Section J. Health Conditions Discharge  Fall(s) Since Admission:  No    Section J. Health Conditions (Pain)  Pain Effect on Sleep:   Rarely or not at all  Pain Interference with Therapy " Activities:   Rarely or not at all  Pain Interference with Day-to-Day Activities:   Rarely or not at all    Section K. Swallowing/Nutritional Status  Nutritional Approaches Past 7 Days:  Nutritional Approaches at Discharge:    Section M. Skin Conditions Discharge  Unhealed Pressure Ulcer(s)/Injurie(s) at Stage 1 or Higher:  No    . Current Number of Unhealed Pressure Ulcers    Number of Unhealed Stage 1 Pressure Injuries: 0  Number of Unhealed Stage 2: 0  Number of Unhealed Stage 3: 0  Number of Unhealed Stage 4: 0  Number of Unhealed Unstageable Due to Non-removable Dressing/Device: 0  Number of Unhealed Unstageable Due to Slough/Eschar: 0  Number of Unhealed Unstageable Injuries Presenting as Deep Tissue Injury: 0    Section N. Medication        Section O. Special Treatments, Procedures, and Programs    Signed by: Nurse Vinny

## 2024-10-08 NOTE — DISCHARGE SUMMARY
"Date of admission: 10/5/2024  Date of discharge: 10/8/2024    Principal diagnosis: Status post bilateral subdural hematomas and bur hole to evacuate.    Secondary diagnoses:  ESRD on dialysis with a right arm fistula  Hypertension  Acute hypoxic respiratory failure probably secondary to CHF, diastolic  Anxiety/depression  Peripheral neuropathy   BPH  Right IJ Thrombus not anticoagulated due to subdural hematoma  Anemia  Hyponatremia    Consultants:  Nephrology    Procedures:  Chest x-ray  Dialysis    Exam: Patient is stating this a.m. that he wants to go home.  He states he is \"resigning\" after dialysis and when I clarified this he stated he was going home.  Pupils are equal lungs clear heart regular rate and rhythm abdomen soft and benign no edema.  Vital signs 117/50, pulse 55, respiratory 18, temperature 90.2, O2 saturation 3 L is 92%    Hospital course: Patient was admitted after hospital course where he had bur holes placed for subdural hematomas.  Patient has only been able to have 3 days of PT/OT/SLP and he has decided he is going home.  His daughters talk to him as well but he wants to proceed home.  They are willing to take him home.  SLP is working with him more for speech and cognitive.  With OT, he is overall mod assist upper body dressing, max lower body dressing, mod assist for toileting.  With PT he is able to walk under 60 feet with a rolling walker min assist, he is contact-guard to min assist for transfers.  It was thought he would benefit from being here around 2 weeks and her team conference this a.m. however he is fairly adamant he is going home and family is willing to help him with his wishes therefore he is being discharged home for outpatient follow-up.  He continued to complain of some \"smothering\" while here although he was better today, he is being run on dialysis the next hour today.  We are arranging home oxygen for him.  Chest x-ray did show some bilateral effusions.  His diuretic was " adjusted by nephrology.  See chart for full details of this visit.  He has had some wide fluctuations of his blood pressure.    Disposition: Home with family    Condition: Stable    Weightbearing: As tolerated    Follow-up:  Home health with SLP, OT, PT, and nursing  PCP 1 week  Dialysis as scheduled    Diet: Regular thin renal low potassium    DME:  Hospital bed, wheelchair, O2    Medication  Albuterol neb every 6 hours as needed  Xanax 0.25 mg twice daily  Norvasc 10 mg daily  Coreg 12.5 mg twice daily  Prozac 10 mg a day  Fluticasone 2 sprays in each nostril daily  Gabapentin 100 mg every night  Norco 7.5 mg every 8 hours as needed  Protonix 40 mg a day  Renvela 2400 mg 3 times a day  Flomax 0.4 mg daily  Demadex 40 mg twice daily    Greater than 30 minutes discharge

## 2024-10-08 NOTE — PROGRESS NOTES
Occupational Therapy: Individual: 0 minutes.    Physical Therapy:    Speech Language Pathology:    Signed by: Anne Marie Lozano OT

## 2024-10-09 NOTE — PROGRESS NOTES
Patient Assessment Instrument  Quality Indicators - Discharge FY 2023    Section A. Transportation  Issues Due to Lack of Transportation:  No    Section A. Medication List        Section B. Health Literacy      Section C. BIMS      Section C. Signs and Symptoms of Delirium (from CAM)      Section D. Mood      Section D. Social Isolation      Section GG. Self-Care Performance      Section GG. Mobility Performance      Section J. Health Conditions Discharge      Section J. Health Conditions (Pain)      Section K. Swallowing/Nutritional Status  Nutritional Approaches Past 7 Days:  Therapeutic diet (e.g., low salt, diabetic,  low cholesterol)  Nutritional Approaches at Discharge:  Therapeutic diet (e.g., low salt,  diabetic, low cholesterol)    Section M. Skin Conditions Discharge      . Current Number of Unhealed Pressure Ulcers      Section N. Medication    Medication Intervention: Not applicable - There were no potential clinically  significant medication issues identified since admission or patient is not  taking any medications.  Patient is taking medications in the following pharmacological classification:  E. Anticoagulant An indication is noted for all medications in the Anticoagulant  drug class.    Section O. Special Treatments, Procedures, and Programs  Oxygen Therapy: Continuous   Dialysis: Hemodialysis    Signed by: Flor Graff, Supervisor

## 2024-10-09 NOTE — PAYOR COMM NOTE
"Flor Graff, RN   Utilization Review Nurse for Inpatient Rehab   Phone: [phone]  Fax: [phone]  Email: [email or web address]  [facility] NPI: [id number]    DISCHARGE NOTIFICATION WITH DISCHARGE SUMMARY  Member:  Simon Benitez  :  1940  REFERENCE# [id number]  ID# [id number]  Admission Date:  10/05/24  Discharge Date:  10/08/24  Disposition:  Home with spouse; [facility] for PT, OT, SLP, and Nursing services.    Simon Benitez (83 y.o. Male)       Date of Birth   1940    Social Security Number       Address   [address] [place] [address]    Home Phone   [phone]    MRN   [id number]       [place]   [employer]    Marital Status                               Admission Date   10/5/24    Admission Type   Elective    Admitting Provider   Teo Guerrero MD    Attending Provider       Department, Room/Bed   [facility], 105/2S       Discharge Date   10/8/2024    Discharge Disposition   Home-Health Care [facility]    Discharge Destination                                 Attending Provider: (none)   Allergies: No Known Allergies    Isolation: None   Infection: None   Code Status: Prior    Ht: 180.3 cm (71\")   Wt: 85 kg (187 lb 6.4 oz)    Admission Cmt: None   Principal Problem: Subdural hematoma [S06.5XAA]                   [facility] Encounter Date/Time: 10/5/2024 [facility]   Hospital Account: [id number]    MRN: [id number]   Patient:  Simon Benitez   Contact Serial #: [id number]   SSN:          ENCOUNTER             Patient Class: Rehab IP   Unit: [facility] Service: Physical Medicine and Rehabilitation     Bed: 105/2S   Admitting Provider: Teo Guerrero MD   Referring Physician: Provider, No Known   Attending Provider:     Adm Diagnosis: Subdural hematoma [S06.5*      PATIENT                 Name: Simon Benitez : 1940 (83 yrs)   Address: [address] [place] [address] Sex: Male   City: [address]"   Marital Status:          Ethnicity: NOT                Race: WHITE   Primary Care Provider: Jung Keller MD         Primary Phone: [phone]   EMERGENCY CONTACT   Contact Name Legal Guardian? Relationship to Patient Home Phone Work Phone   1. Christi Willams  2. Deepika Puri      Spouse  Daughter [phone]                 GUARANTOR            Guarantor: Simon Willams     : 1940   Address: [address] [place] [address] Sex: [place] [address]     Relation to Patient: Self       Home Phone: [phone]   Guarantor ID: [id number]       Work Phone:     GUARANTOR EMPLOYER   Employer: RETIRED         Status: RETIRED      COVERAGE          PRIMARY INSURANCE   Payor: [employer] MEDICARE REPLACEMENT Plan: [employer]   Group Number: [id number] Insurance Type: INDEMNITY   Subscriber Name: SIMON WILLAMS Subscriber : 1940   Subscriber ID: [id number] Coverage Address: [address] [address]   Pre-Certification #: Pre-cert number: N/A Authorization #:     Pat. Rel. to Subscriber: Self Coverage Phone: [phone]           Sena Guerrero MSW     Case Management  Significant Note      Signed  Date of Service:  10/08/24 1841  Creation Time:  10/08/24 1841     Signed             10/08/24 170   Plan   Plan Received voicemail from Gabby with [facility] saying pt will receive HH SLP after therapist is out of training and will notify PCP about this.  Gabby also says [facility]  does not have SLP at this time.  Contacted spouse [phone] with this information who voiced understanding.  MD notified via chat message.  SLP was sent a message via chat.                    Sena Guerrero MSW     Case Management  Significant Note      Signed  Date of Service:  10/08/24 1839  Creation Time:  10/08/24 1839     Signed             10/08/24 3215   Plan   Plan MD is agreeable to discharge pt home this pm after HD.   Contacted [facility] [phone] per Janes about discharge.  Pt can resume outpatient HD on Tuesday, Thursday, Saturday arrive at 11:00 am starting 10-10-24.  Spoke to spouse [phone] about discharge after HD, recommendations for home health PT,OT, SLP, hospital bed (explained upcharge fee for fully electric bed which can be up to $200), home O2 at 3L NC continuous with portable O2 tanks, 18 inch lightweight W/C with swing away leg rests, anti-tippers, basic cushion, and resuming outpatient HD at [facility] on 10-10-24.  [facility] is preferred.  Spouse does not have a DME provider preference and prefers hospital bed be delivered tomorrow.  Spouse and daughter Deepika will transport pt home after HD.   Contacted [facility] [phone] per Gabby with referral, discharge today, and need for PT 2-3 x wk x 8 wks for strengthening, endurance, gait training, transfer training, therapeutic exercise, home safety, functional mobility, OT 2-3 x wk x 8 wks for ADL re-training, home safety, functional mobility, strengthening, endurance, SLP 2-3 x wk x 8 wks for expressive language, auditory processing, ADL's, safety, nursing to monitor head incision, medication education, monitor B/P, monitor AV fistuala R arm after HD for bleeding, CBC/CMP in 2 days/call to attending.  SLP is in-training and Gabby will call back about SLP services.  Faxed face sheet, H&P, PT/OT/SLP notes/evaluations, MD progress note, discharge summary and ambulatory referral for home health with face to face.  Contacted [facility] [phone] per Yusra with discharge, need for home O2 at 3L NC continuous with portable O2 tanks, 18 inch lightweight W/C with swing away leg rests, anti-tippers, basic cushion, hospital bed and family wanting to pay upcharge fee for fully electric bed (one time fee is $200).  Portable O2 to be delivered to rehab, W/C and O2 concentrator to be delivered to pt's home; spouse to be contacted  about delivery.  Hospital bed can be delivered to pt's home on 10-9-24.  Faxed MD orders for DME, nursing note with O2 sat results on RA, face sheet, H&P, and therapy notes to [facility] via [employer].  Faxed discharge summary [facility] [phone].                   Sena Guerrero MSW     Case Management  Significant Note      Signed  Date of Service:  10/08/24 1825  Creation Time:  10/08/24 1825     Signed             10/08/24 1300   Plan   Plan Spoke to daughter Deepika who has talked to her mother and she is agreeable to take pt home this pm after pt receives HD.  Home health, W/C, fully electric hospital bed and home O2 are needed.  Family can pay upcharge fee for fully electric hospital bed since insurance only pays for semi-electric bed.   Patient/Family in Agreement with Plan yes                       Sena Guerrero MSW     Case Management  Significant Note      Signed  Date of Service:  10/08/24 1823  Creation Time:  10/08/24 1823     Signed             10/08/24 0900   Plan   Plan Team conference held today.  Pt is recommended for discharge on 10-18-24, however, pt is wanting to go home today.  Spoke to daughter Deepika, who wants pt to receive HD then they can discuss plans and options with pt.  Pt wants to return home with spouse.                      Francois Coffey MD   Physician  Medicine     Discharge Summary      Signed     Date of Service: 10/08/24 1523  Creation Time: 10/08/24 1523     Signed         Date of admission: 10/5/2024  Date of discharge: 10/8/2024     Principal diagnosis: Status post bilateral subdural hematomas and bur hole to evacuate.     Secondary diagnoses:  ESRD on dialysis with a right arm fistula  Hypertension  Acute hypoxic respiratory failure probably secondary to CHF, diastolic  Anxiety/depression  Peripheral neuropathy   BPH  Right IJ Thrombus not anticoagulated due to subdural hematoma  Anemia  Hyponatremia    "  Consultants:  Nephrology     Procedures:  Chest x-ray  Dialysis     Exam: Patient is stating this a.m. that he wants to go home.  He states he is \"resigning\" after dialysis and when I clarified this he stated he was going home.  Pupils are equal lungs clear heart regular rate and rhythm abdomen soft and benign no edema.  Vital signs 117/50, pulse 55, respiratory 18, temperature 90.2, O2 saturation 3 L is 92%     Hospital course: Patient was admitted after hospital course where he had bur holes placed for subdural hematomas.  Patient has only been able to have 3 days of PT/OT/SLP and he has decided he is going home.  His daughters talk to him as well but he wants to proceed home.  They are willing to take him home.  SLP is working with him more for speech and cognitive.  With OT, he is overall mod assist upper body dressing, max lower body dressing, mod assist for toileting.  With PT he is able to walk under 60 feet with a rolling walker min assist, he is contact-guard to min assist for transfers.  It was thought he would benefit from being here around 2 weeks and her team conference this a.m. however he is fairly adamant he is going home and family is willing to help him with his wishes therefore he is being discharged home for outpatient follow-up.  He continued to complain of some \"smothering\" while here although he was better today, he is being run on dialysis the next hour today.  We are arranging home oxygen for him.  Chest x-ray did show some bilateral effusions.  His diuretic was adjusted by nephrology.  See chart for full details of this visit.  He has had some wide fluctuations of his blood pressure.     Disposition: Home with family     Condition: Stable     Weightbearing: As tolerated     Follow-up:  Home health with SLP, OT, PT, and nursing  PCP 1 week  Dialysis as scheduled     Diet: Regular thin renal low potassium     DME:  Hospital bed, wheelchair, O2     Medication  Albuterol neb every 6 hours as " needed  Xanax 0.25 mg twice daily  Norvasc 10 mg daily  Coreg 12.5 mg twice daily  Prozac 10 mg a day  Fluticasone 2 sprays in each nostril daily  Gabapentin 100 mg every night  Norco 7.5 mg every 8 hours as needed  Protonix 40 mg a day  Renvela 2400 mg 3 times a day  Flomax 0.4 mg daily  Demadex 40 mg twice daily     Greater than 30 minutes discharge